# Patient Record
Sex: MALE | Race: BLACK OR AFRICAN AMERICAN | NOT HISPANIC OR LATINO | Employment: FULL TIME | URBAN - METROPOLITAN AREA
[De-identification: names, ages, dates, MRNs, and addresses within clinical notes are randomized per-mention and may not be internally consistent; named-entity substitution may affect disease eponyms.]

---
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
## 2022-04-11 NOTE — TELEPHONE ENCOUNTER
[facility] Call Center    Phone Message    May a detailed message be left on voicemail: yes     Reason for Call: Other: Please fax a copy of patients physical exam to [employer] director at [phone]. He needs it to play in the game tonight.     Action Taken: Message routed to:  [facility] ([facility]): PCC    Travel Screening: Not Applicable                                                                      
Spoke to the patient who states the his physical exam was already sent over to his  and he was good to go.. Chuyita Arceo LPN 12/8/2021 11:38 AM    
Breath sounds clear and equal bilaterally.

## 2022-04-28 ENCOUNTER — OFFICE VISIT (OUTPATIENT)
Dept: INTERNAL MEDICINE | Facility: CLINIC | Age: 22
End: 2022-04-28
Payer: COMMERCIAL

## 2022-04-28 VITALS
WEIGHT: 162 LBS | HEIGHT: 69 IN | SYSTOLIC BLOOD PRESSURE: 143 MMHG | OXYGEN SATURATION: 98 % | DIASTOLIC BLOOD PRESSURE: 81 MMHG | HEART RATE: 82 BPM | BODY MASS INDEX: 23.99 KG/M2

## 2022-04-28 DIAGNOSIS — B07.0 VERRUCA PLANTARIS: ICD-10-CM

## 2022-04-28 DIAGNOSIS — J20.9 ACUTE BRONCHITIS, UNSPECIFIED ORGANISM: Primary | ICD-10-CM

## 2022-04-28 LAB — SARS-COV-2 RNA RESP QL NAA+PROBE: NEGATIVE

## 2022-04-28 PROCEDURE — U0003 INFECTIOUS AGENT DETECTION BY NUCLEIC ACID (DNA OR RNA); SEVERE ACUTE RESPIRATORY SYNDROME CORONAVIRUS 2 (SARS-COV-2) (CORONAVIRUS DISEASE [COVID-19]), AMPLIFIED PROBE TECHNIQUE, MAKING USE OF HIGH THROUGHPUT TECHNOLOGIES AS DESCRIBED BY CMS-2020-01-R: HCPCS | Mod: 90 | Performed by: PATHOLOGY

## 2022-04-28 PROCEDURE — 99000 SPECIMEN HANDLING OFFICE-LAB: CPT | Performed by: PATHOLOGY

## 2022-04-28 PROCEDURE — 99214 OFFICE O/P EST MOD 30 MIN: CPT | Mod: CS | Performed by: STUDENT IN AN ORGANIZED HEALTH CARE EDUCATION/TRAINING PROGRAM

## 2022-04-28 PROCEDURE — U0005 INFEC AGEN DETEC AMPLI PROBE: HCPCS | Mod: 90 | Performed by: PATHOLOGY

## 2022-04-28 RX ORDER — CETIRIZINE HYDROCHLORIDE 10 MG/1
10 TABLET ORAL DAILY
COMMUNITY

## 2022-04-28 RX ORDER — GUAIFENESIN 600 MG/1
1200 TABLET, EXTENDED RELEASE ORAL 2 TIMES DAILY
Qty: 20 TABLET | Refills: 0 | Status: SHIPPED | OUTPATIENT
Start: 2022-04-28 | End: 2022-07-25

## 2022-04-28 NOTE — LETTER
Samson Morales  April 28, 2022      To Whom It May Concern,    I am, Ollie Tong, the physician taking care of Samson Morales and managing his acute illness. Please excuse him for missing his class as he was sick for the past 3 days and he will need to quarantine until his COVID-19 test comes back negative (test date 4/28/22). If patient tested positive, should follow up [facility] quarantine recommendation. Please call if you have any questions or concerns.    Thank you,    Ollie Tong MD  Internal Medicine PGY-3  [facility]   April 28, 2022

## 2022-04-28 NOTE — NURSING NOTE
Chief Complaint   Patient presents with     URI     Pt complaining of ongoing cold symptoms persisting for 4 days, eye lids have been feeling strained same with eyes themselves     Nail Problem     Right index finger growth/hangnail       Alessia Andrews, EMT at 9:42 AM on 4/28/2022

## 2022-04-28 NOTE — PROGRESS NOTES
PRIMARY CARE CENTER       SUBJECTIVE:  Samson Morales is a 21 year old male with a PMHx of seasonal allergies, acne, chronic allergic conjunctivitis who comes in for cough.    Patient reports that 2-3 days ago he started to have a runny nose which now resolved then started to have dry cough which is bothering him. His cough is worse towards the end of the day but not worse with laying in bedHe denied any fever, chills or night sweats. No sinus congestion, post nasal drip no SOB or chest pain. No known sick contacts.    He also concerned about a right index finger lesion (see below). Started awhile ago and it is nonpainful and does no itch. It a hyperpigmented small growth on the lateral edge of the nail. Lesion had grew in the past but not recently.       Also it was noticed on exam that patient's right middle finger has a linear hyperpigmented subungual discoloration. Patient reports that this has been going for awhile too.            Medications and allergies reviewed by me today.     ROS:   Constitutional, neuro, ENT, endocrine, pulmonary, cardiac, gastrointestinal, genitourinary, musculoskeletal, integument and psychiatric systems are negative, except as otherwise noted.    OBJECTIVE:    There were no vitals taken for this visit.   Wt Readings from Last 1 Encounters:   11/09/21 73.9 kg (163 lb) (66 %, Z= 0.41)*     * Growth percentiles are based on CDC (Boys, 2-20 Years) data.     Physical Exam:  General: AAOx3, NAD  HEENT: Oral mucosa moist and non-erythematous, PERRLA, EOM intact  CV: RRR, normal S1S2, no murmur, clicks, rubs  Resp: Clear to auscultation bilaterally, no wheezes, rhonchi    ASSESSMENT/PLAN:    Samson was seen today for uri and nail problem.    Diagnoses and all orders for this visit:    Acute bronchitis,   Patient with 2 days hx of rhinorrhea and cough. Most likely due to a viral ANY (bronchitis). Will continue to monitor and do further work-up if not resolving in 2  weeks  -     guaiFENesin (MUCINEX) 600 MG 12 hr tablet; Take 2 tablets (1,200 mg) by mouth 2 times daily  -     Symptomatic; Yes; 4/25/2022 COVID-19 Virus (Coronavirus) by PCR Nasopharyngeal    Verruca plantaris  Recommend to follow up in 1 month for cryotherapy with liquid nitrogen     Linear discoloration of the right index nail   Will curbside dermatology, concerns about an infections process (fungal) vs malignancy (melanoma) which is less likely giving patient's age. Will follow up with dermatology and patient for further recs      Ollie Tong MD   PGY-3  Apr 28, 2022    Pt was seen and plan of care discussed with Dr. Krause

## 2022-05-16 ENCOUNTER — OFFICE VISIT (OUTPATIENT)
Dept: ORTHOPEDICS | Facility: CLINIC | Age: 22
End: 2022-05-16
Payer: COMMERCIAL

## 2022-05-16 ENCOUNTER — PRE VISIT (OUTPATIENT)
Dept: ORTHOPEDICS | Facility: CLINIC | Age: 22
End: 2022-05-16

## 2022-05-16 VITALS — WEIGHT: 162.04 LBS | BODY MASS INDEX: 24 KG/M2 | HEIGHT: 69 IN

## 2022-05-16 DIAGNOSIS — M25.311 INSTABILITY OF BOTH SHOULDER JOINTS: Primary | ICD-10-CM

## 2022-05-16 DIAGNOSIS — M25.312 INSTABILITY OF BOTH SHOULDER JOINTS: Primary | ICD-10-CM

## 2022-05-16 PROCEDURE — 99213 OFFICE O/P EST LOW 20 MIN: CPT | Performed by: ORTHOPAEDIC SURGERY

## 2022-05-16 NOTE — LETTER
Verification of Appointment  May 16, 2022     Seen today: yes    Patient:  Samson Morales  :   2000  MRN:     [id number]  Physician: BULMARO OSBORNE    Samson Morales was seen in clinic today, 22, for his right shoulder.      Please call the clinic with any questions.      Electronically signed by Bulmaro Osborne MD

## 2022-05-16 NOTE — NURSING NOTE
"Reason For Visit:   Chief Complaint   Patient presents with     Consult     Right shoulder consult.  Ref Dr. Jb Reyes       PCP: [facility], [facility]      ?  No  Occupation Student/ Starting a job in a few weeks       Type of injury: Playing sports he dislocated a few times   Date of surgery: None   Smoker: No    Right  hand dominant    SANE score  Affected shoulder: Right   Right shoulder SANE: 90  Left shoulder SANE: 90    Ht 1.753 m (5' 9.02\")   Wt 73.5 kg (162 lb 0.6 oz)   BMI 23.92 kg/m        Pain Assessment  Patient Currently in Pain: Yes  0-10 Pain Scale: 2  Primary Pain Location: Shoulder  Pain Descriptors: Discomfort    Magui Julian LPN      "

## 2022-05-16 NOTE — LETTER
5/16/2022         RE: Samson Morales  [address] [facility] [address]        Dear Colleague,    Thank you for referring your patient, Samson Morales, to the [facility] [place]. Please see a copy of my visit note below.    CHIEF COMPLAINT:  Right shoulder pain.    HISTORY OF PRESENT ILLNESS:  Mr. Morales is a very pleasant, 21-year-old, right-hand-dominant student with a history of right greater than left shoulder pain.  He has had painless instability for years, but noticed that it got substantially worse in the fall of 2021.  He had episodes where it really was bothering him a lot when he would dive or reach for a loose ball.  He had one episode where he felt something tear, but played through it.  The pain hurts for 1-3 minutes and then disappears.  He has never had a fady instability event requiring manipulative reduction.    PAST MEDICAL HISTORY:  None.    PAST SURGICAL HISTORY:  None.    MEDICATIONS:  None.    HABITS:  He does not smoke.    SOCIAL HISTORY:  He is a student.  He plays basketball, soccer, working out at the gym and kickboxing.    FAMILY HISTORY:  Negative for dislocating shoulders, dislocating kneecaps, rotator cuff tears or rheumatoid arthritis.    RADIOGRAPHS:  AP and lateral of the glenohumeral joint and lateral scapula, date of surgery 05/16/2022, showed no evidence of fracture, dislocation or abnormal calcific focus.    MRI scan dated 11/14/2021 and reviewed independently by me shows bilateral shoulders. Right shows an anterior inferior labral tear consistent with Bankart, and left shows an anterior inferior Bankart tear with no evidence of rotator cuff disease or bone loss.    PHYSICAL EXAMINATION:  On exam today, he is a well-developed male in no apparent distress.  He articulates and communicates well with a normal affect.  His breathing is nonlabored.  His extraocular movements are intact.  He has 180 degrees of right-sided forward elevation, 180  degrees of lateral elevation, 95 degrees of external rotation at the side and internal rotation at the back to T7.  He has 5/5 forward elevator and external rotator strength.  He has no jerk test.  He has no Speed, Yergason or [place].  He has positive true apprehension in abduction and external rotation, but no relocation test.  He has a normal lift off.    On the left side, he is 180 degrees of active forward elevation, 180 degrees of lateral elevation, 95 degrees of external rotation at the side and internal rotation at the back to T7.  He has a normal lift off.  He has 5/5 forward elevator and external rotator strength.  He has a normal jerk test.  He has a normal Speed, Yergason and [place].  He has positive true apprehension with no relocation improvement.  He has normal grind bilaterally.    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    PLAN:  I had a lengthy talk with Mr. Morales today.  We talked at length about surgical and nonsurgical treatment of his shoulder.  I do not think he is really that unstable.  I think it would be reasonable to consider periscapular stabilization and strengthening with proprioceptive retraining and physical therapy.  I think there is a good likelihood that this will be all that is necessary, and we advised him to the fact that some of the things he wants to do (particularly side flank) are not necessarily even possible with surgical stabilization.  Consequently, I think that a very, very logical first step would be to try physical therapy and see if that is enough to get him better.  If it is, that would be great.  If not, we will see him back and discuss what surgical repair and reconstruction would look like.      Parish Osborne MD

## 2022-05-16 NOTE — PROGRESS NOTES
CHIEF COMPLAINT:  Right shoulder pain.    HISTORY OF PRESENT ILLNESS:  Mr. Morales is a very pleasant, 21-year-old, right-hand-dominant student with a history of right greater than left shoulder pain.  He has had painless instability for years, but noticed that it got substantially worse in the fall of 2021.  He had episodes where it really was bothering him a lot when he would dive or reach for a loose ball.  He had one episode where he felt something tear, but played through it.  The pain hurts for 1-3 minutes and then disappears.  He has never had a fady instability event requiring manipulative reduction.    PAST MEDICAL HISTORY:  None.    PAST SURGICAL HISTORY:  None.    MEDICATIONS:  None.    HABITS:  He does not smoke.    SOCIAL HISTORY:  He is a student.  He plays basketball, soccer, working out at the gym and kickboxing.    FAMILY HISTORY:  Negative for dislocating shoulders, dislocating kneecaps, rotator cuff tears or rheumatoid arthritis.    RADIOGRAPHS:  AP and lateral of the glenohumeral joint and lateral scapula, date of surgery 05/16/2022, showed no evidence of fracture, dislocation or abnormal calcific focus.    MRI scan dated 11/14/2021 and reviewed independently by me shows bilateral shoulders. Right shows an anterior inferior labral tear consistent with Bankart, and left shows an anterior inferior Bankart tear with no evidence of rotator cuff disease or bone loss.    PHYSICAL EXAMINATION:  On exam today, he is a well-developed male in no apparent distress.  He articulates and communicates well with a normal affect.  His breathing is nonlabored.  His extraocular movements are intact.  He has 180 degrees of right-sided forward elevation, 180 degrees of lateral elevation, 95 degrees of external rotation at the side and internal rotation at the back to T7.  He has 5/5 forward elevator and external rotator strength.  He has no jerk test.  He has no Speed, Yergason or [place].  He has positive true  apprehension in abduction and external rotation, but no relocation test.  He has a normal lift off.    On the left side, he is 180 degrees of active forward elevation, 180 degrees of lateral elevation, 95 degrees of external rotation at the side and internal rotation at the back to T7.  He has a normal lift off.  He has 5/5 forward elevator and external rotator strength.  He has a normal jerk test.  He has a normal Speed, [place]ergason and [place].  He has positive true apprehension with no relocation improvement.  He has normal grind bilaterally.    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    PLAN:  I had a lengthy talk with Mr. Morales today.  We talked at length about surgical and nonsurgical treatment of his shoulder.  I do not think he is really that unstable.  I think it would be reasonable to consider periscapular stabilization and strengthening with proprioceptive retraining and physical therapy.  I think there is a good likelihood that this will be all that is necessary, and we advised him to the fact that some of the things he wants to do (particularly side flank) are not necessarily even possible with surgical stabilization.  Consequently, I think that a very, very logical first step would be to try physical therapy and see if that is enough to get him better.  If it is, that would be great.  If not, we will see him back and discuss what surgical repair and reconstruction would look like.

## 2022-06-03 ENCOUNTER — THERAPY VISIT (OUTPATIENT)
Dept: PHYSICAL THERAPY | Facility: CLINIC | Age: 22
End: 2022-06-03
Attending: ORTHOPAEDIC SURGERY
Payer: COMMERCIAL

## 2022-06-03 DIAGNOSIS — M25.312 INSTABILITY OF BOTH SHOULDER JOINTS: ICD-10-CM

## 2022-06-03 DIAGNOSIS — M25.311 INSTABILITY OF BOTH SHOULDER JOINTS: ICD-10-CM

## 2022-06-03 PROCEDURE — 97161 PT EVAL LOW COMPLEX 20 MIN: CPT | Mod: GP | Performed by: PHYSICAL THERAPIST

## 2022-06-03 PROCEDURE — 97110 THERAPEUTIC EXERCISES: CPT | Mod: GP | Performed by: PHYSICAL THERAPIST

## 2022-06-03 PROCEDURE — 97112 NEUROMUSCULAR REEDUCATION: CPT | Mod: GP | Performed by: PHYSICAL THERAPIST

## 2022-06-03 NOTE — PROGRESS NOTES
[facility]    OUTPATIENT Physical Therapy ORTHOPEDIC EVALUATION  PLAN OF TREATMENT FOR OUTPATIENT REHABILITATION  (COMPLETE FOR INITIAL CLAIMS ONLY)  Patient's Last Name, First Name, M.I.  YOB: 2000  Samson Morales    Provider s Name:  [facility]   Medical Record No.  [id number]   Start of Care Date:  06/03/22   Onset Date:   10/01/21   Type:     _X__PT   ___OT Medical Diagnosis:    Encounter Diagnosis   Name Primary?    Instability of both shoulder joints         Treatment Diagnosis:  Bilateral shoulder pain, instability        Goals:     06/03/22 0500   Body Part   Goals listed below are for B shoulder   Goal #1   Goal #1 lifting/carrying   Previous Functional Level No restrictions   Current Functional Level Cannot lift;overhead for   Performance level any object >10 pounds, 7/10 pain   STG [employer] Performance Lift an item overhead weighing   Performance level 15 pounds, 3/10 pain or better   Rationale for grocery shopping;for meal preparation;for yard work such as shoveling snow;for housework such as laundry, emptying garbage, use of ;to perform job duties at work   Due date 07/01/22   LTG [employer] Lift an item overhead weighing   Performance Level 20 pounds with 1/10 shoulder pain   Rationale for grocery shopping;for housework such as laundry, emptying garbage, use of ;for yard work such as shoveling snow;to perform job duties at work;for meal preparation   Due date 07/29/22       Therapy Frequency:  2x/month  Predicted Duration of Therapy Intervention:  3 months, 6 visits total    Silvio Raya, PT                 I CERTIFY THE NEED FOR THESE SERVICES FURNISHED UNDER        THIS PLAN OF TREATMENT AND WHILE UNDER MY CARE     (Physician co-signature of this document indicates review and certification of the therapy plan).                      Certification Date From:  06/03/22   Certification Date To:  08/26/22    Referring Provider:  Parish Osborne    Initial Assessment        See Epic Evaluation SOC Date: 06/03/22

## 2022-06-03 NOTE — PROGRESS NOTES
"Physical Therapy Initial Evaluation  Subjective:  The history is provided by the patient. No  was used.   Patient Health History  Samson Morales being seen for Bilateral shoulder pain, unsteadiness in shoulder while weight lifting and playing sports.     Problem began: 10/1/2021.   Problem occurred: various sports injuries from diving, pulling, blocking and overhead reaching. One or two times where patient heard audible \"pop\" or tear in shoulder while playing basketball   Pain is reported as 0/10 (7/10 at worst when in full shoulder flexion) on pain scale.  General health as reported by patient is excellent.  Pertinent medical history includes: none.   Red flags:  None as reported by patient.  Medical allergies: none.   Surgeries include:  None.    Current medications:  None.    Current occupation is Student.                     Therapist Generated HPI Evaluation         Type of problem:  Bilateral shoulders (right more than left).    This is a chronic condition.  Condition occurred with:  Lifting (recreation/sports).  Where condition occurred: during recreation/sport.  Patient reports pain:  Posterior and in the joint.  Pain is described as aching and is intermittent.  Pain radiates to:  Shoulder. Pain timing: worse with overhead activities.  Since onset symptoms are gradually worsening.  Associated symptoms:  Loss of strength and painful arc (\"wobbly\"). Symptoms are exacerbated by using arm at shoulder level, using arm behind back and carrying  Relieved by: avoiding overhead motions.    There was none improvement following previous treatment.  Restrictions due to condition include:  Working in normal job without restrictions.  Barriers include:  None as reported by patient.                        Objective:  Standing Alignment:      Shoulder/UE:  Rounded shoulders and scapular winging R                                       Shoulder Evaluation:  ROM:  AROM:  : Full AROM, mild pain in posterior " shoulder with end range IR and flexion.                              Pain: Pain at end range flexion posterior shoulder and laterally; IR painful in joint area mostly posterior    Strength:    Flexion: Left:5/5   Pain:    Right: 5/5     Pain:   Extension:  Left: 5/5    Pain:    Right: 5/5    Pain:  Abduction:  Left: 5/5  Pain:    Right: 5/5     Pain:    Internal Rotation:  Left:5/5     Pain:    Right: 5/5      Pain:+  External Rotation:   Left:4/5     Pain:   Right:4/5     Pain:            Stability Testing:    Left shoulder stability positive testing:  Apprehension  Left shoulder stability negative testing:  Relocation; Load and shift anterior and Load and shift posterior    Right shoulder stability negative testing:  Apprehension; Relocation; Load and shift anterior and Load and shift posterior  Special Tests:  Special tests assessed shoulder: + HK bilaterally.  Left shoulder positive for the following special tests:  Impingement  Right shoulder positive for the following special tests:Impingement  Palpation:  normal      Mobility Tests:    Glenohumeral anterior left:  Hypermobile  Glenohumeral anterior right:  Hypermobile  Glenohumeral posterior right:  Hypermobile                                             General     ROS    Assessment/Plan:    Patient is a 21 year old male with both sides shoulder complaints.    Patient has the following significant findings with corresponding treatment plan.                Diagnosis 1:  Bilateral shoulder pain, instability  Pain -  manual therapy, self management, education and home program  Decreased ROM/flexibility - manual therapy, therapeutic exercise and home program  Decreased joint mobility - manual therapy, therapeutic exercise and home program  Decreased strength - therapeutic exercise, therapeutic activities and home program  Impaired muscle performance - neuro re-education and home program  Decreased function - therapeutic activities and home program  Instability -   Therapeutic Activity  Therapeutic Exercise  home program    Therapy Evaluation Codes:   Cumulative Therapy Evaluation is: Low complexity.    Previous and current functional limitations:  (See Goal Flow Sheet for this information)    Short term and Long term goals: (See Goal Flow Sheet for this information)     Communication ability:  Patient appears to be able to clearly communicate and understand verbal and written communication and follow directions correctly.  Treatment Explanation - The following has been discussed with the patient:   RX ordered/plan of care  Anticipated outcomes  Possible risks and side effects  This patient would benefit from PT intervention to resume normal activities.   Rehab potential is good.    Frequency:  2 X a month, once daily  Duration:  for 3 months  Discharge Plan:  Achieve all LTG.  Independent in home treatment program.  Reach maximal therapeutic benefit.    Please refer to the daily flowsheet for treatment today, total treatment time and time spent performing 1:1 timed codes.

## 2022-07-23 ENCOUNTER — LAB (OUTPATIENT)
Dept: LAB | Facility: CLINIC | Age: 22
End: 2022-07-23
Payer: COMMERCIAL

## 2022-07-23 DIAGNOSIS — Z20.822 ENCOUNTER FOR LABORATORY TESTING FOR COVID-19 VIRUS: ICD-10-CM

## 2022-07-23 LAB — SARS-COV-2 RNA RESP QL NAA+PROBE: NEGATIVE

## 2022-07-23 PROCEDURE — U0003 INFECTIOUS AGENT DETECTION BY NUCLEIC ACID (DNA OR RNA); SEVERE ACUTE RESPIRATORY SYNDROME CORONAVIRUS 2 (SARS-COV-2) (CORONAVIRUS DISEASE [COVID-19]), AMPLIFIED PROBE TECHNIQUE, MAKING USE OF HIGH THROUGHPUT TECHNOLOGIES AS DESCRIBED BY CMS-2020-01-R: HCPCS | Mod: 90 | Performed by: PATHOLOGY

## 2022-07-23 PROCEDURE — U0005 INFEC AGEN DETEC AMPLI PROBE: HCPCS | Mod: 90 | Performed by: PATHOLOGY

## 2022-07-23 PROCEDURE — 99000 SPECIMEN HANDLING OFFICE-LAB: CPT | Performed by: PATHOLOGY

## 2022-07-25 ENCOUNTER — OFFICE VISIT (OUTPATIENT)
Dept: INTERNAL MEDICINE | Facility: CLINIC | Age: 22
End: 2022-07-25

## 2022-07-25 ENCOUNTER — LAB (OUTPATIENT)
Dept: LAB | Facility: CLINIC | Age: 22
End: 2022-07-25
Payer: COMMERCIAL

## 2022-07-25 VITALS
HEIGHT: 69 IN | OXYGEN SATURATION: 99 % | HEART RATE: 64 BPM | WEIGHT: 163.8 LBS | DIASTOLIC BLOOD PRESSURE: 84 MMHG | SYSTOLIC BLOOD PRESSURE: 122 MMHG | BODY MASS INDEX: 24.26 KG/M2

## 2022-07-25 DIAGNOSIS — Z01.818 PREOP GENERAL PHYSICAL EXAM: Primary | ICD-10-CM

## 2022-07-25 DIAGNOSIS — Z01.818 PREOP GENERAL PHYSICAL EXAM: ICD-10-CM

## 2022-07-25 LAB
ANION GAP SERPL CALCULATED.3IONS-SCNC: <1 MMOL/L (ref 3–14)
BUN SERPL-MCNC: 14 MG/DL (ref 7–30)
CALCIUM SERPL-MCNC: 9.6 MG/DL (ref 8.5–10.1)
CHLORIDE BLD-SCNC: 105 MMOL/L (ref 98–110)
CO2 SERPL-SCNC: 29 MMOL/L (ref 20–32)
CREAT SERPL-MCNC: 0.88 MG/DL (ref 0.5–1)
ERYTHROCYTE [DISTWIDTH] IN BLOOD BY AUTOMATED COUNT: 12.9 % (ref 10–15)
GFR SERPL CREATININE-BSD FRML MDRD: >90 ML/MIN/1.73M2
GLUCOSE BLD-MCNC: 80 MG/DL (ref 70–99)
HCT VFR BLD AUTO: 43.9 % (ref 40–53)
HGB BLD-MCNC: 14.1 G/DL (ref 13.3–17.7)
MCH RBC QN AUTO: 25.6 PG (ref 26.5–33)
MCHC RBC AUTO-ENTMCNC: 32.1 G/DL (ref 31.5–36.5)
MCV RBC AUTO: 80 FL (ref 78–100)
PLATELET # BLD AUTO: 300 10E3/UL (ref 150–450)
POTASSIUM BLD-SCNC: 4.3 MMOL/L (ref 3.4–5.3)
RBC # BLD AUTO: 5.51 10E6/UL (ref 4.4–5.9)
SODIUM SERPL-SCNC: 131 MMOL/L (ref 133–144)
WBC # BLD AUTO: 5.2 10E3/UL (ref 4–11)

## 2022-07-25 PROCEDURE — 36415 COLL VENOUS BLD VENIPUNCTURE: CPT | Performed by: PATHOLOGY

## 2022-07-25 PROCEDURE — 85027 COMPLETE CBC AUTOMATED: CPT | Performed by: PATHOLOGY

## 2022-07-25 PROCEDURE — 99213 OFFICE O/P EST LOW 20 MIN: CPT | Performed by: HOSPITALIST

## 2022-07-25 PROCEDURE — 80048 BASIC METABOLIC PNL TOTAL CA: CPT | Performed by: PATHOLOGY

## 2022-07-25 NOTE — PATIENT INSTRUCTIONS
Approved for procedure.   Needs baseline labs with CBC and BMP.   No Ibuprofen, Aleve, Excedrin, Naproxen from now to surgery.   No alcohol from now to surgery.     Follow up as needed.   04-Jul-2018

## 2022-07-25 NOTE — PROGRESS NOTES
Surgeon (please enter first and last name): Elias Menjivar  Fax number for Preop Evaluation: [phone]  Location of Surgery: ENT specialty care   Date of Surgery: 7/28/22  Procedure: Fractured nose surgery per patient.  History of reaction to anesthesia? No    Garett Purcell, EMT at 3:09 PM on 7/25/2022.

## 2022-07-25 NOTE — PROGRESS NOTES
[facility] INTERNAL MEDICINE [address]  4TH FLOOR  [facility] [address]  Phone: [phone]  Fax: [phone]  Primary Provider: [facility]  Pre-op Performing Provider: MICHAEL VERAS    {Provider  Link to PREOP SmartSet  Use this to apply standard patient instructions to AVS; includes medication directions, common orders, guidelines for anemia, warfarin, additional testing   :[id number]}  PREOPERATIVE EVALUATION:  Today's date: 7/25/2022    Samson Morales is a 21 year old male who presents for a preoperative evaluation.    Surgical Information:  Surgery/Procedure: Deviated septum correction  Surgery Location: ENT specialty care   Surgeon: Dr. Elias Menjivar  Surgery Date: 7/28/22  Time of Surgery: To be determine, told in the morning.  Where patient plans to recover: At home with family  Fax number for surgical facility: [phone]    Type of Anesthesia Anticipated: to be determined    Assessment & Plan     The proposed surgical procedure is considered INTERMEDIATE risk.    Problem List Items Addressed This Visit        Other    Preop general physical exam - Primary     Approved for procedure. RCRI 0 of 6. METs likely 4 and above. No clotting disorders. No medication allergies. Has left nostril polyp present with left nasal deviation. COVID19 test on 7/23/22 was negative.     - CBC is normal with Hb at 14.1.   - BMP with Cr at 0.88. Sodium is mildly low at 131, not clear reason but likely not to impact prep for surgery.   - Advised no Ibuprofen, Aleve, Excedrin, Naproxen from now to surgery.   - Advised no alcohol from now to surgery.            Relevant Orders    CBC with platelets (Completed)    Basic metabolic panel (Completed)            RECOMMENDATION:  APPROVAL GIVEN to proceed with proposed procedure, without further diagnostic evaluation.    Review of external notes as documented above     25 minutes spent on the date of the  encounter doing chart review, history and exam, documentation and further activities per the note        Subjective     HPI related to upcoming procedure: Patient mentions having left nostril irritation at times. Mentions his nose runs more during the winter and sometimes bleeds when it gets bad. He does have troubles breathing when playing sports. Mentions he does have a nodule in his let nostril. No fevers, chills, chest pains, currently bleeding issues.     Preop Questions 7/25/2022   1. Have you ever had a heart attack or stroke? No   2. Have you ever had surgery on your heart or blood vessels, such as a stent placement, a coronary artery bypass, or surgery on an artery in your head, neck, heart, or legs? No   3. Do you have chest pain with activity? No   4. Do you have a history of  heart failure? No   5. Do you currently have a cold, bronchitis or symptoms of other infection? No   6. Do you have a cough, shortness of breath, or wheezing? No   7. Do you or anyone in your family have previous history of blood clots? No   8. Do you or does anyone in your family have a serious bleeding problem such as prolonged bleeding following surgeries or cuts? No   9. Have you ever had problems with anemia or been told to take iron pills? No   10. Have you had any abnormal blood loss such as black, tarry or bloody stools? No   11. Have you ever had a blood transfusion? No   12. Are you willing to have a blood transfusion if it is medically needed before, during, or after your surgery? Yes   13. Have you or any of your relatives ever had problems with anesthesia? No   14. Do you have sleep apnea, excessive snoring or daytime drowsiness? No   15. Do you have any artifical heart valves or other implanted medical devices like a pacemaker, defibrillator, or continuous glucose monitor? No   16. Do you have artificial joints? No   17. Are you allergic to latex? No       Health Care Directive:  Patient does not have a Health Care  Directive or Living Will: Full code for procedure    Preoperative Review of :   reviewed - no record of controlled substances prescribed.        Review of Systems  CONSTITUTIONAL: NEGATIVE for fever, chills, change in weight  INTEGUMENTARY/SKIN: NEGATIVE for worrisome rashes, moles or lesions  EYES: NEGATIVE for vision changes or irritation  ENT/MOUTH: NEGATIVE for ear, mouth and throat problems  RESP: NEGATIVE for significant cough or SOB  CV: NEGATIVE for chest pain, palpitations or peripheral edema  GI: NEGATIVE for nausea, abdominal pain, heartburn, or change in bowel habits  : NEGATIVE for frequency, dysuria, or hematuria  MUSCULOSKELETAL: NEGATIVE for significant arthralgias or myalgia  NEURO: NEGATIVE for weakness, dizziness or paresthesias  ENDOCRINE: NEGATIVE for temperature intolerance, skin/hair changes  HEME: NEGATIVE for bleeding problems  PSYCHIATRIC: NEGATIVE for changes in mood or affect    Patient Active Problem List    Diagnosis Date Noted     Instability of both shoulder joints 05/16/2022     Priority: Medium     Instability of right shoulder joint 10/18/2019     Priority: Medium     Allergic rhinitis due to pollen 04/17/2019     Priority: Medium     Chronic allergic conjunctivitis 09/09/2016     Priority: Medium     Episodic tension-type headache, not intractable 09/09/2016     Priority: Medium     IMMUNIZATION HISTORY 12/22/2014     Priority: Medium     needs hep A #2 after 2/4/15  Starting HPV 12/22/2014         Nutritional deficiency 12/22/2014     Priority: Medium     Limited 0-1 serving daily/day - will give calcium 500mg/day and MV       Vision problem 12/22/2014     Priority: Medium     Wears glasses and is followed by optometry       Environmental allergies 12/17/2014     Priority: Medium      Past Medical History:   Diagnosis Date     Eczema      Seasonal allergies      No past surgical history on file.  Current Outpatient Medications   Medication Sig Dispense Refill      "cetirizine (ZYRTEC) 10 MG tablet Take 10 mg by mouth daily         No Known Allergies     Social History     Tobacco Use     Smoking status: Never Smoker     Smokeless tobacco: Never Used   Substance Use Topics     Alcohol use: No     No family history on file.  History   Drug Use Unknown         Objective     /84 (BP Location: Right arm, Patient Position: Sitting, Cuff Size: Adult Regular)   Pulse 64   Ht 1.753 m (5' 9\")   Wt 74.3 kg (163 lb 12.8 oz)   SpO2 99%   BMI 24.19 kg/m      Physical Exam    GENERAL APPEARANCE: healthy, alert and no distress     EYES: EOMI,  PERRL     HENT: ear canals and TM's normal and mouth without ulcers or lesions. Has left nasal deviation. Has left nostril polyp present.      NECK: no adenopathy, no asymmetry, masses, or scars and thyroid normal to palpation     RESP: lungs clear to auscultation - no rales, rhonchi or wheezes     CV: regular rates and rhythm, normal S1 S2, no S3 or S4 and no murmur, click or rub     ABDOMEN:  soft, nontender, no HSM or masses and bowel sounds normal     MS: extremities normal- no gross deformities noted, no evidence of inflammation in joints, FROM in all extremities.     SKIN: no suspicious lesions or rashes     NEURO: Normal strength and tone, sensory exam grossly normal, mentation intact and speech normal     PSYCH: mentation appears normal. and affect normal/bright    No results for input(s): HGB, PLT, INR, NA, POTASSIUM, CR, A1C in the last [id number] hours.     Diagnostics:  Today on 7/25/22:  BMP shows Creatinine of 0.88. Sodium is mildly low at 131. Potassium is normal at 4.3, chloride normal at 105, calcium normal at 9.6, and glucose of 80.   CBC shows WBC 5.2, Hb 14.1, Platelets 300.    COVID19 test negative on 7/23/22.     Revised Cardiac Risk Index (RCRI):  The patient has the following serious cardiovascular risks for perioperative complications:   - No serious cardiac risks = 0 points     RCRI Interpretation: 0 points: Class I (very " low risk - 0.4% complication rate)           Signed Electronically by: Rodolfo Mackey MD  Copy of this evaluation report is provided to requesting physician.

## 2022-07-25 NOTE — NURSING NOTE
Chief Complaint   Patient presents with     Pre-Op Exam       ALEXANDER Zapata at 3:16 PM on 7/25/2022.

## 2022-07-27 ENCOUNTER — THERAPY VISIT (OUTPATIENT)
Dept: PHYSICAL THERAPY | Facility: CLINIC | Age: 22
End: 2022-07-27
Payer: COMMERCIAL

## 2022-07-27 DIAGNOSIS — M25.311 INSTABILITY OF BOTH SHOULDER JOINTS: Primary | ICD-10-CM

## 2022-07-27 DIAGNOSIS — M25.312 INSTABILITY OF BOTH SHOULDER JOINTS: Primary | ICD-10-CM

## 2022-07-27 PROCEDURE — 97110 THERAPEUTIC EXERCISES: CPT | Mod: GP | Performed by: PHYSICAL THERAPIST

## 2022-09-11 ENCOUNTER — HEALTH MAINTENANCE LETTER (OUTPATIENT)
Age: 22
End: 2022-09-11

## 2023-01-19 ENCOUNTER — OFFICE VISIT (OUTPATIENT)
Dept: INTERNAL MEDICINE | Facility: CLINIC | Age: 23
End: 2023-01-19
Payer: COMMERCIAL

## 2023-01-19 VITALS
OXYGEN SATURATION: 98 % | SYSTOLIC BLOOD PRESSURE: 123 MMHG | HEIGHT: 69 IN | WEIGHT: 176.8 LBS | DIASTOLIC BLOOD PRESSURE: 83 MMHG | BODY MASS INDEX: 26.19 KG/M2 | HEART RATE: 56 BPM

## 2023-01-19 DIAGNOSIS — A63.0 GENITAL WARTS: Primary | ICD-10-CM

## 2023-01-19 PROCEDURE — 99214 OFFICE O/P EST MOD 30 MIN: CPT | Mod: GC | Performed by: STUDENT IN AN ORGANIZED HEALTH CARE EDUCATION/TRAINING PROGRAM

## 2023-01-19 RX ORDER — IMIQUIMOD 37.5 MG/G
CREAM TOPICAL DAILY
Qty: 7.5 G | Refills: 1 | Status: SHIPPED | OUTPATIENT
Start: 2023-01-19 | End: 2023-05-03

## 2023-01-19 NOTE — PROGRESS NOTES
"CC: penile lesions      HPI:  22M presents today to discuss wart on finger and lesions on the head of his penis. These have been present for about a month or so. Nonpainful, no draining, no dysuria, white/pale in color, slightly raised. Tells me he has never been sexually active. No fevers. No new lotions or topical treatments, not itching. No similar rash in other locations though does have a wart on his finger per below.    For wart on finger, has been present for a long time. He wants this frozen off. Has been present for sometime but its location causes him some irritation so wants removed.    PMH: I have personally reviewed the patient's medical history, medications, and allergies.      /83 (BP Location: Right arm, Patient Position: Sitting, Cuff Size: Adult Regular)   Pulse 56   Ht 1.753 m (5' 9.02\")   Wt 80.2 kg (176 lb 12.8 oz)   SpO2 98%   BMI 26.10 kg/m    Physical Examination:    General:  Alert, NAD  HEENT: NC/AT.   Lungs: breathing comfortably on room air  : few ~0.5cm hypopigmented papules along the shaft and head of penis; no ulcers/erythema/penile discharge noted.  Neuro: Alert and conversant, face symmetric. No gross neurologic deficits.  Skin:   Genital exam per above. One 1cm wart on lateral aspect of second digit of left hand.  Psych:  Alert and oriented. Appropriate affect.      Exam done with chaperone present (Freddy).    A&P:  Genital warts  -topical imiquimod daily x8 weeks  -dermatology referral if does not improve with above treatment    Verruca vulgaris, second digit left hand  -cryotherapy performed in clinic today. Applied 3 times. Done with preceptor present.    Shantal Urban MD  IM Resident PGY-3    This patient was discussed and seen with Dr Medina  "

## 2023-01-19 NOTE — NURSING NOTE
Samson Morales is a 22 year old male patient that presents today in clinic for the following:    Chief Complaint   Patient presents with     RECHECK     Genital bumps, concerns about finger      The patient's allergies and medications were reviewed as noted. A set of vitals were recorded as noted without incident. The patient does not have any other questions for the provider.    Jose Luis Brown, EMT at 10:10 AM on 1/19/2023

## 2023-01-22 ENCOUNTER — HEALTH MAINTENANCE LETTER (OUTPATIENT)
Age: 23
End: 2023-01-22

## 2023-04-04 ENCOUNTER — OFFICE VISIT (OUTPATIENT)
Dept: INTERNAL MEDICINE | Facility: CLINIC | Age: 23
End: 2023-04-04
Payer: COMMERCIAL

## 2023-04-04 VITALS
BODY MASS INDEX: 25.83 KG/M2 | WEIGHT: 174.4 LBS | OXYGEN SATURATION: 97 % | HEIGHT: 69 IN | HEART RATE: 57 BPM | DIASTOLIC BLOOD PRESSURE: 68 MMHG | SYSTOLIC BLOOD PRESSURE: 126 MMHG

## 2023-04-04 DIAGNOSIS — R05.3 CHRONIC COUGH: Primary | ICD-10-CM

## 2023-04-04 PROCEDURE — 17110 DESTRUCTION B9 LES UP TO 14: CPT | Performed by: NURSE PRACTITIONER

## 2023-04-04 PROCEDURE — 99213 OFFICE O/P EST LOW 20 MIN: CPT | Mod: 25 | Performed by: NURSE PRACTITIONER

## 2023-04-04 RX ORDER — ALBUTEROL SULFATE 90 UG/1
2 AEROSOL, METERED RESPIRATORY (INHALATION) EVERY 4 HOURS PRN
Qty: 18 G | Refills: 1 | Status: SHIPPED | OUTPATIENT
Start: 2023-04-04

## 2023-04-04 NOTE — PATIENT INSTRUCTIONS
Wart treatment:  apply Compound W to affected area daily after a few days from clinic appointment.

## 2023-04-04 NOTE — PROGRESS NOTES
"S: Samson Morales is a 22 year old male here due to a lingering cough after an acute illness about two weeks ago. It is worse in the evening and he thinks it is aggravated when talking on the phone with friends. He had an acute illness 2-3 weeks ago with sore throat.  He describes the cough as high pitched and now mostly non-productive. He had a fever in the first days of the illness.He does not think he wheezes. It has improved since onset but he thought he should have it checked.                Patient Active Problem List   Diagnosis     Environmental allergies     IMMUNIZATION HISTORY     Nutritional deficiency     Vision problem     Chronic allergic conjunctivitis     Episodic tension-type headache, not intractable     Allergic rhinitis due to pollen     Instability of right shoulder joint     Instability of both shoulder joints     Preop general physical exam            Past Medical History:   Diagnosis Date     Eczema      Seasonal allergies           No past surgical history on file.         Social History     Tobacco Use     Smoking status: Never     Smokeless tobacco: Never   Vaping Use     Vaping status: Not on file   Substance Use Topics     Alcohol use: No          No family history on file.          No Known Allergies         Current Outpatient Medications   Medication Sig Dispense Refill     cetirizine (ZYRTEC) 10 MG tablet Take 10 mg by mouth daily       imiquimod 3.75 % CREA Externally apply topically daily 7.5 g 1       REVIEW OF SYSTEMS:  See above.    O:   Ht 1.753 m (5' 9.02\")   BMI 26.10 kg/m    GENERAL APPEARANCE: healthy, alert and no distress  EYES:sclera white, conjunctiva normal.  HENT: ear canals and TM's normal and mouth without ulcers or lesions  RESP: lungs clear to auscultation - no rales, rhonchi or wheezes  CV: regular rates and rhythm, normal S1 S2, no S3 or S4 and no murmur, click or rub   NEURO: alert and oriented.  Good historian.  MUSK: ambulatory.  SKIN: One 1cm wart on " lateral aspect of second digit of left hand.  LYMPH: neg cervical, supra and infraclavicular nodes.  EXT: warm.  Edema: none  PSYCHE:         A/P:  Samson was seen today for cough.    Diagnoses and all orders for this visit:    Chronic cough  -     albuterol (PROAIR HFA/PROVENTIL HFA/VENTOLIN HFA) 108 (90 Base) MCG/ACT inhaler; Inhale 2 puffs into the lungs every 4 hours as needed for shortness of breath, wheezing or cough      Verruca vulgaris,  second digit left hand medial axel-ungual area  -cryotherapy performed in clinic today. Applied 3 times.  He is advised to apply OTC Compound W at home.        @THIS VISIT    The patient voiced understanding of the information discussed and all questions were answered.     I spent a total of      minutes in the care of this pt during today's office visit. This time includes reviewing the patient's chart and prior history, obtaining a history, performing an examination and evaluation and counseling the patient. This time also includes ordering medications or tests necessary in addition to communication to other member's of the patient's health care team. Time spent in documentation and care coordination is included.     Staci ENCINAS, CNP

## 2023-04-04 NOTE — NURSING NOTE
"Samson Morales is a 22 year old male patient that presents today in clinic for the following:    Chief Complaint   Patient presents with     Cough     Pt here for cough that occurs a lot during the night but more recently is not as much. Pt states started 2-3 weeks ago. Pt has an area on right index finger that hurts when touched.     The patient's allergies and medications were reviewed as noted. A set of vitals were recorded as noted without incident: /68 (BP Location: Right arm, Patient Position: Sitting, Cuff Size: Adult Regular)   Pulse 57   Ht 1.753 m (5' 9.02\")   Wt 79.1 kg (174 lb 6.4 oz)   SpO2 97%   BMI 25.74 kg/m  . The patient does not have any other questions for the provider.    Kaila Washington, EMT at 4:01 PM on 4/4/2023  "

## 2023-04-21 ENCOUNTER — OFFICE VISIT (OUTPATIENT)
Dept: PODIATRY | Facility: CLINIC | Age: 23
End: 2023-04-21
Payer: COMMERCIAL

## 2023-04-21 ENCOUNTER — ANCILLARY PROCEDURE (OUTPATIENT)
Dept: GENERAL RADIOLOGY | Facility: CLINIC | Age: 23
End: 2023-04-21
Attending: PODIATRIST
Payer: COMMERCIAL

## 2023-04-21 VITALS — BODY MASS INDEX: 25.68 KG/M2 | WEIGHT: 174 LBS

## 2023-04-21 DIAGNOSIS — S93.422A SPRAIN OF DELTOID LIGAMENT OF LEFT ANKLE, INITIAL ENCOUNTER: Primary | ICD-10-CM

## 2023-04-21 DIAGNOSIS — S99.912A LEFT ANKLE INJURY: ICD-10-CM

## 2023-04-21 DIAGNOSIS — M25.372 ANKLE INSTABILITY, LEFT: ICD-10-CM

## 2023-04-21 DIAGNOSIS — S93.492A SPRAIN OF ANTERIOR TALOFIBULAR LIGAMENT, LEFT, INITIAL ENCOUNTER: ICD-10-CM

## 2023-04-21 PROCEDURE — 73610 X-RAY EXAM OF ANKLE: CPT | Mod: TC | Performed by: RADIOLOGY

## 2023-04-21 PROCEDURE — 99204 OFFICE O/P NEW MOD 45 MIN: CPT | Performed by: PODIATRIST

## 2023-04-21 NOTE — LETTER
April 21, 2023      Samson Morales  [address] [place] [address]        To Whom It May Concern:    Samson Morales was seen in our clinic on 4/21/23 for evaluation of left ankle injury. Please allow him to return to work with the following restrictions:     Work in the boot for 6 weeks     For questions or concerns, please contact the number listed above.       Sincerely,        Aundrea Schwartz DPM    (Electronically signed)

## 2023-04-21 NOTE — PROGRESS NOTES
Assessment:      ICD-10-CM    1. Sprain of deltoid ligament of left ankle, initial encounter  S93.422A XR Ankle Left G/E 3 Views     Orthotics and Prosthetics DME Orthotic; Foot Orthotics     MR Ankle Left w/o Contrast      2. Sprain of anterior talofibular ligament, left, initial encounter  S93.492A Orthotics and Prosthetics DME Orthotic; Foot Orthotics     MR Ankle Left w/o Contrast      3. Ankle instability, left  M25.372 Orthotics and Prosthetics DME Orthotic; Foot Orthotics     MR Ankle Left w/o Contrast          Plan:  Orders Placed This Encounter   Procedures     XR Ankle Left G/E 3 Views     MR Ankle Left w/o Contrast     Ankle/Foot Bracing Supplies DME Walking Boot; Left; Pneumatic; Tall     Orthotics and Prosthetics DME Orthotic; Foot Orthotics       Discussed the etiology and treatment of the condition with the patient.  Imaging studies reviewed and discussed with the patient.  Discussed surgical and conservative options.      Acute Deltoid sprain & avlusion Fx anterior colliculus  Recommend MRI to evaluate deep deltoid    -Immobilization- boot WB x6 weeks  -WB- minimal in boot  -Activity- minimal in boot, no impact x3 months  -PT- once out of boot      Return:  Return in about 6 weeks (around 6/2/2023).    Aundrea Schwartz DPM                Chief Complaint:     Patient presents with:  Left Ankle - Pain     left ankle injury.    HPI:  Samson Morales is a 22 year old year old male who presents for evaluation of ankle injury.    Date of injury- 4/19/23  Mechanism of injury- sprained playing basketball  Pain location- medial ankle     Pt states he has sprained the L ankle 3 times previously.  He has never had it this swollen before  States he has flat feet      Past Medical & Surgical History:  Past Medical History:   Diagnosis Date     Eczema      Seasonal allergies       No past surgical history on file.   No family history on file.     Social History:  ?  History   Smoking Status     Never    Smokeless Tobacco     Never     History   Drug Use Unknown     Social History    Substance and Sexual Activity      Alcohol use: No      Allergies:  ?   No Known Allergies     Medications:    Current Outpatient Medications   Medication     albuterol (PROAIR HFA/PROVENTIL HFA/VENTOLIN HFA) 108 (90 Base) MCG/ACT inhaler     cetirizine (ZYRTEC) 10 MG tablet     imiquimod 3.75 % CREA     No current facility-administered medications for this visit.       Physical Exam:  ?  Vitals:  Wt 78.9 kg (174 lb)   BMI 25.68 kg/m     General:  WD/WN, in NAD.  A&O x3.  Dermatologic:    Skin is intact, open lesions absent.   Bruising present medial > lateral ankle L.  Skin texture, turgor is normal.  Vascular:  Pulses palpable bilateral.  Digital capillary refill time normal bilateral.  Skin temperature is warm L  Generalized edema- none bilateral..  Focal edema- moderate anterior medial ankle joint, L.  Neurologic:    Gross sensation normal.  Gait and balance abnormal, antalgic, L.  Musculoskeletal:  Maximal pain to palpation of anterior colliculus, medial malleolus over superficial deltoid, L.  mild pain to palpation of ATFL / lateral ankle gutter, L  No pain to palpation AITFL, CFL, peroneals, L  Ankle ROM full, painful  L.  Guards to drawer exam, L  Anterior drawer stable right.  Talar tilt stable right.  Tib / Fib squeeze test pain free left.    Muscle strength 5/5  to uninjured extremity, guarding of injured extremity.     Patient is able to bear weight on the injured extremity.  Patient is able to walk on the injured extremity.    Imaging:  x-ray independently reviewed and interpreted by myself today.  Weight-bearing views left ankle dated 04/21/23, reveal anterior colliculus avulsion Fx, mortise with 1mm overlap on AP, none on MO.  Flatfoot without degeneration.

## 2023-04-21 NOTE — LETTER
4/21/2023         RE: Samson Morales  [address]  [place] [address]        Dear Colleague,    Thank you for referring your patient, Samson Morales, to the [facility]. Please see a copy of my visit note below.    Assessment:      ICD-10-CM    1. Sprain of deltoid ligament of left ankle, initial encounter  S93.422A XR Ankle Left G/E 3 Views     Orthotics and Prosthetics DME Orthotic; Foot Orthotics     MR Ankle Left w/o Contrast      2. Sprain of anterior talofibular ligament, left, initial encounter  S93.492A Orthotics and Prosthetics DME Orthotic; Foot Orthotics     MR Ankle Left w/o Contrast      3. Ankle instability, left  M25.372 Orthotics and Prosthetics DME Orthotic; Foot Orthotics     MR Ankle Left w/o Contrast          Plan:  Orders Placed This Encounter   Procedures     XR Ankle Left G/E 3 Views     MR Ankle Left w/o Contrast     Ankle/Foot Bracing Supplies DME Walking Boot; Left; Pneumatic; Tall     Orthotics and Prosthetics DME Orthotic; Foot Orthotics       Discussed the etiology and treatment of the condition with the patient.  Imaging studies reviewed and discussed with the patient.  Discussed surgical and conservative options.      Acute Deltoid sprain & avlusion Fx anterior colliculus  Recommend MRI to evaluate deep deltoid    -Immobilization- boot WB x6 weeks  -WB- minimal in boot  -Activity- minimal in boot, no impact x3 months  -PT- once out of boot      Return:  Return in about 6 weeks (around 6/2/2023).    Aundrea Schwartz DPM                Chief Complaint:     Patient presents with:  Left Ankle - Pain     left ankle injury.    HPI:  Samson Morales is a 22 year old year old male who presents for evaluation of ankle injury.    Date of injury- 4/19/23  Mechanism of injury- sprained playing basketball  Pain location- medial ankle     Pt states he has sprained the L ankle 3 times previously.  He has never had it this swollen before  States he has flat  feet      Past Medical & Surgical History:  Past Medical History:   Diagnosis Date     Eczema      Seasonal allergies       No past surgical history on file.   No family history on file.     Social History:  ?  History   Smoking Status     Never   Smokeless Tobacco     Never     History   Drug Use Unknown     Social History    Substance and Sexual Activity      Alcohol use: No      Allergies:  ?   No Known Allergies     Medications:    Current Outpatient Medications   Medication     albuterol (PROAIR HFA/PROVENTIL HFA/VENTOLIN HFA) 108 (90 Base) MCG/ACT inhaler     cetirizine (ZYRTEC) 10 MG tablet     imiquimod 3.75 % CREA     No current facility-administered medications for this visit.       Physical Exam:  ?  Vitals:  Wt 78.9 kg (174 lb)   BMI 25.68 kg/m     General:  WD/WN, in NAD.  A&O x3.  Dermatologic:    Skin is intact, open lesions absent.   Bruising present medial > lateral ankle L.  Skin texture, turgor is normal.  Vascular:  Pulses palpable bilateral.  Digital capillary refill time normal bilateral.  Skin temperature is warm L  Generalized edema- none bilateral..  Focal edema- moderate anterior medial ankle joint, L.  Neurologic:    Gross sensation normal.  Gait and balance abnormal, antalgic, L.  Musculoskeletal:  Maximal pain to palpation of anterior colliculus, medial malleolus over superficial deltoid, L.  mild pain to palpation of ATFL / lateral ankle gutter, L  No pain to palpation AITFL, CFL, peroneals, L  Ankle ROM full, painful  L.  Guards to drawer exam, L  Anterior drawer stable right.  Talar tilt stable right.  Tib / Fib squeeze test pain free left.    Muscle strength 5/5  to uninjured extremity, guarding of injured extremity.     Patient is able to bear weight on the injured extremity.  Patient is able to walk on the injured extremity.    Imaging:  x-ray independently reviewed and interpreted by myself today.  Weight-bearing views left ankle dated 04/21/23, reveal anterior colliculus  avulsion Fx, mortise with 1mm overlap on AP, none on MO.  Flatfoot without degeneration.              Again, thank you for allowing me to participate in the care of your patient.        Sincerely,        Aundrea Schwartz DPM

## 2023-04-21 NOTE — PATIENT INSTRUCTIONS
PATIENT INSTRUCTIONS - Podiatry / Foot & Ankle Surgery            Note for work - boot x6 weeks      Boot Immobilization:  size 10 tall Aircast  Wear the boot at all times when walking or standing.  Wear the boot for 4 weeks.  You may remove the boot when at rest for ankle and toe motion & for bathing/showering.  Do not drive in the boot; wear the boot to & from the car, then switch to a stiff soled shoe for driving.  You do not need to sleep in the boot.  Wear compression (ACE bandage or Tubigrip) under the boot to decrease swelling.  Minimal activity in the boot until follow-up.        Imaging  An advanced imaging study has been ordered for you today MRI  MRI:  Recommend 3T MRI at [facility] or [facility].    Please call radiology to schedule your study:      [facility] ([place] and [place] clinics and surgery centers): [phone]    [facility] (both [place] [facility])  [facility] - [facility], including [facility] CUSTOM FOOT ORTHOTICS LOCATIONS  [facility]  [address]  [place] [address]  Phone: [phone]  Fax: [phone] [facility]  [address]  [place] [address]  Phone: [phone]   Fax: [phone]   [facility]  [address]  [place] [address]  Phone: [phone]  Fax: [phone] [facility]  [address]  [place] [address]  Phone: [phone]   Fax: [phone]   [facility]   [address]  [place] [address]  Phone: [phone]  Fax: [phone] * Please call any location listed to make an appointment for a casting/fitting. Your referral was sent to their central office and they will all have the order on file.

## 2023-05-03 ENCOUNTER — TELEPHONE (OUTPATIENT)
Dept: INTERNAL MEDICINE | Facility: CLINIC | Age: 23
End: 2023-05-03

## 2023-05-03 ENCOUNTER — OFFICE VISIT (OUTPATIENT)
Dept: INTERNAL MEDICINE | Facility: CLINIC | Age: 23
End: 2023-05-03
Payer: COMMERCIAL

## 2023-05-03 VITALS
SYSTOLIC BLOOD PRESSURE: 132 MMHG | OXYGEN SATURATION: 99 % | DIASTOLIC BLOOD PRESSURE: 82 MMHG | BODY MASS INDEX: 25.86 KG/M2 | HEART RATE: 66 BPM | WEIGHT: 175.2 LBS

## 2023-05-03 DIAGNOSIS — A63.0 GENITAL WARTS: ICD-10-CM

## 2023-05-03 PROCEDURE — 99213 OFFICE O/P EST LOW 20 MIN: CPT | Performed by: PEDIATRICS

## 2023-05-03 RX ORDER — IMIQUIMOD 37.5 MG/G
CREAM TOPICAL DAILY
Qty: 7.5 G | Refills: 4 | Status: SHIPPED | OUTPATIENT
Start: 2023-05-03 | End: 2023-05-03

## 2023-05-03 RX ORDER — IMIQUIMOD 12.5 MG/.25G
CREAM TOPICAL
Qty: 12 PACKET | Refills: 3 | Status: SHIPPED | OUTPATIENT
Start: 2023-05-03

## 2023-05-03 NOTE — NURSING NOTE
Samson Morales is a 22 year old male that presents in clinic today for the following:     Chief Complaint   Patient presents with     STD     Pt here to discuss warts on genitals and screening for std's       The patient's allergies and medications were reviewed. The patient's vitals were obtained without incident. The patient does not have any other questions for the provider.     Santy Duncan, EMT at 10:47 AM on 5/3/2023.  Primary Care Clinic: [phone]

## 2023-05-03 NOTE — TELEPHONE ENCOUNTER
Received call from [facility] retail pharmacy about the following medication:     Outpatient Medication Detail     Disp Refills Start End KRISTINE   imiquimod 3.75 % CREA 7.5 g 4 5/3/2023  No   Sig - Route: Externally apply topically daily - Apply externally   Sent to pharmacy as: Imiquimod 3.75 % External Cream   Class: E-Prescribe   Order: [id number]   E-Prescribing Status: Receipt confirmed by pharmacy (5/3/2023 10:58 AM CDT)     Pharmacy stated they do not carry and can't order this strength. They stated they do have the medication in 5% form and it comes in packets, is typically used 3 times per week (?).     Garett CAMPOS, EMT at 1:55 PM on 5/3/2023.  Primary Care Clinic: [phone]

## 2023-05-03 NOTE — PROGRESS NOTES
"Dear patient. Thank you for visiting with me. I want you to feel respected, understood, and empowered. \"Respect\" is valuing you as much as I would a close family member. \"Empowerment\" happens when you are fully informed, and can make the best possible decision for you.  Please ask me questions!  Challenge anything that is not clear.    Medical records are primarily used as memory aids for me and my colleagues. Things to know about my documentation style:  - The 'problem list' includes current symptoms or diagnoses, and some problems that are resolved but may return. I use the past medical history for problems not expected to return.  - I use single quotation marks for things that you or I said, when I want to clarify who was speaking.  - I use double quotation marks when copying a term from elsewhere in your records. Italics (besides here) may also denote a quotation.  If you have questions or concerns, please contact me; I will reply as soon as time allows.    Subjective     Samson Morales is a 22 year old man, with concerns including:  Chief Complaint   Patient presents with     STD     Pt here to discuss warts on genitals and screening for std's     PCP: Clinic - Lee Babb MD   Visit type: focused problem-oriented    Patient was seen 01/19/2023 by Dr. Urban for Genital Warts and given Imiquimod 3.75% cream for 8 weeks.  He was instructed that if no improvement, the plan was to refer to Dermatology.    He reports he did not take Imiquimod 3.75% cream consistently until 3 weeks ago, now taking qd for approximately 8h at a time.  He states he has noticed slight improvement and denies blisters or drainage. He has a Dermatology appointment coming up this month (05/06/2023 with Kalyn Amos).  He also reports itching and discomfort, and asked clarification regarding masturbation and friction exacerbating symptoms.        Objective     /82 (BP Location: Right arm, Patient Position: Sitting, Cuff " Size: Adult Regular)   Pulse 66   Wt 79.5 kg (175 lb 3.2 oz)   SpO2 99%   BMI 25.86 kg/m      Physical Exam  Genitourinary:     Penis: Lesions (multiple flattened lesions that appear partially treated over glans, rim, and part of the distal shaft) present.       Comments: irregularities in scrotal tissue; do not appear to be varicose  no vesicles, no signs of super infection or excoriation               Assessment & Plan   Genital Warts:  - Partially treated  - Recommending patient continue with Imiquimod 3.75% cream for 8 full weeks as originally reocmmended and follow up with Dermatology.    - A follow up exam will likely be necessary this summer to ensure there is no recurrence.  - Discussed OTC local anisthetic spray for itching and discomfort. Advised patient on when and how to use.  - Advised against masturbation to avoid spread of lesions and friction exacerbating symptoms.  - Discussed risk of penile cancer and how to follow up      Scribe Disclosure:   I, KIRBY CHANELLE, am serving as a scribe; to document services personally performed by Lee Babb MD -based on data collection and the provider's statements to me.     Provider Disclosure:  I agree with above History, Review of Systems, Physical exam and Plan.  I have reviewed the content of the documentation and have edited it as needed. I have personally performed the services documented here and the documentation accurately represents those services and the decisions I have made.      Electronically signed by:  Lee Babb MD       About this visit:  Time note (e3, 20'): The total time (on the date of service) for this service was 25 minutes, including discussion/face-to-face, chart review, interpretation not otherwise reported, documentation, and updating of the computerized record.

## 2023-05-06 ENCOUNTER — ANCILLARY PROCEDURE (OUTPATIENT)
Dept: MRI IMAGING | Facility: CLINIC | Age: 23
End: 2023-05-06
Attending: PODIATRIST
Payer: COMMERCIAL

## 2023-05-06 DIAGNOSIS — S93.492A SPRAIN OF ANTERIOR TALOFIBULAR LIGAMENT, LEFT, INITIAL ENCOUNTER: ICD-10-CM

## 2023-05-06 DIAGNOSIS — S93.422A SPRAIN OF DELTOID LIGAMENT OF LEFT ANKLE, INITIAL ENCOUNTER: ICD-10-CM

## 2023-05-06 DIAGNOSIS — M25.372 ANKLE INSTABILITY, LEFT: ICD-10-CM

## 2023-05-06 PROCEDURE — 73721 MRI JNT OF LWR EXTRE W/O DYE: CPT | Mod: LT | Performed by: RADIOLOGY

## 2023-05-09 ENCOUNTER — MYC MEDICAL ADVICE (OUTPATIENT)
Dept: PODIATRY | Facility: CLINIC | Age: 23
End: 2023-05-09
Payer: COMMERCIAL

## 2023-05-09 NOTE — TELEPHONE ENCOUNTER
Please call patient and have him follow-up Fri at [facility] for his ankle MRI.    Aundrea Schwartz DPM

## 2023-05-09 NOTE — TELEPHONE ENCOUNTER
Called; spoke with patient and got him scheduled for 1045 at [facility] on Friday.     DENA Funes

## 2023-05-12 ENCOUNTER — OFFICE VISIT (OUTPATIENT)
Dept: PODIATRY | Facility: CLINIC | Age: 23
End: 2023-05-12
Payer: COMMERCIAL

## 2023-05-12 VITALS — OXYGEN SATURATION: 100 % | BODY MASS INDEX: 25.83 KG/M2 | HEART RATE: 66 BPM | WEIGHT: 175 LBS

## 2023-05-12 DIAGNOSIS — S93.422A SPRAIN OF DELTOID LIGAMENT OF LEFT ANKLE, INITIAL ENCOUNTER: Primary | ICD-10-CM

## 2023-05-12 DIAGNOSIS — M95.8 OSTEOCHONDRAL DEFECT OF ANKLE: ICD-10-CM

## 2023-05-12 DIAGNOSIS — M25.372 ANKLE INSTABILITY, LEFT: ICD-10-CM

## 2023-05-12 PROCEDURE — 99214 OFFICE O/P EST MOD 30 MIN: CPT | Performed by: PODIATRIST

## 2023-05-12 NOTE — PROGRESS NOTES
Assessment:      ICD-10-CM    1. Sprain of deltoid ligament of left ankle, initial encounter  S93.422A Physical Therapy Referral      2. Ankle instability, left  M25.372 Physical Therapy Referral      3. Osteochondral defect of ankle  M95.8 Physical Therapy Referral          Plan:  Orders Placed This Encounter   Procedures     Physical Therapy Referral       Discussed the etiology and treatment of the condition with the patient.  Imaging studies reviewed and discussed with the patient.  Discussed surgical and conservative options.      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    Aundrea Schwartz DPM                Chief Complaint:     Patient presents with:  Left Ankle - RECHECK     left ankle injury.    HPI:  Samson Morales is a 22 year old year old male who presents for evaluation of ankle injury.    5/12/23  Pt states he has sprained L ankle at least twice in the past; has never sprained R  Has been in the boot since LOV  Can walk at home without boot but not out of home      Date of injury- 4/19/23  Mechanism of injury- sprained playing basketball  Pain location- medial ankle     Pt states he has sprained the L ankle 3 times previously.  He has never had it this swollen before  States he has flat feet      Past Medical & Surgical History:  Past Medical History:   Diagnosis Date     Eczema      Seasonal allergies       No past surgical history on file.   No family history on file.     Social History:  ?  History   Smoking  Status     Never   Smokeless Tobacco     Never     History   Drug Use Unknown     Social History    Substance and Sexual Activity      Alcohol use: No      Allergies:  ?   No Known Allergies     Medications:    Current Outpatient Medications   Medication     albuterol (PROAIR HFA/PROVENTIL HFA/VENTOLIN HFA) 108 (90 Base) MCG/ACT inhaler     cetirizine (ZYRTEC) 10 MG tablet     imiquimod (ALDARA) 5 % external cream     No current facility-administered medications for this visit.       Physical Exam:  ?  Vitals:  Pulse 66   Wt 79.4 kg (175 lb)   SpO2 100%   BMI 25.83 kg/m     General:  WD/WN, in NAD.  A&O x3.  Dermatologic:    Skin is intact, open lesions absent.   Bruising present medial > lateral ankle L.  Skin texture, turgor is normal.  Vascular:  Pulses palpable bilateral.  Digital capillary refill time normal bilateral.  Skin temperature is warm L  Generalized edema- none bilateral..  Focal edema- moderate anterior medial ankle joint, L.  Neurologic:    Gross sensation normal.  Gait and balance abnormal, antalgic, L.  Musculoskeletal:  Moderate pain to palpation of anterior colliculus, medial malleolus over superficial deltoid, L.  mild pain to palpation of ATFL / lateral ankle gutter, L  No pain to palpation AITFL, CFL, peroneals, L  Ankle ROM full, painful  L.  Ankle anterior drawer / talar tilt unstable,  left, vs R  Deltoid drawer - increased L vs R    Muscle strength 5/5  to uninjured extremity, guarding of injured extremity.     RCSP-   Valgus bilateral      Imaging:  MRI independently reviewed and interpreted by myself today.   left ankle dated 5/2023, reveal complete deltoid rupture including avulsion from medial malleolus - in area of Fracture- and rupture of deep deltoid at talar body insertion.  Medial talar dome osteochondral defect - ~5mm deep.  Moderate attenuation spring ligament  Lateral ligament attenuation      x-ray independently reviewed and interpreted by myself today.  Weight-bearing  views left ankle dated 04/21/23, reveal anterior colliculus avulsion Fx, mortise with 1mm overlap on AP, none on MO.  Flatfoot without degeneration.

## 2023-05-12 NOTE — PATIENT INSTRUCTIONS
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  Wear the boot for 4-6 weeks total.  You may remove the boot when at rest for ankle and toe motion & for bathing/showering.  Do not drive in the boot; wear the boot to & from the car, then switch to a stiff soled shoe for driving.  You do not need to sleep in the boot.  Wear compression (ACE bandage or Tubigrip) under the boot to decrease swelling.  Minimal activity in the boot     Then use the ankle brace after the boot when active        Surgery:  Ankle arthroscopy and microfracture / grafting OCD talus; Deltoid ligament repair, possible Brostrom Calvin lateral ligament repair   Recovery - 6 weeks no weight, then 2-4 weeks in a boot      Surgery Scheduling  Please call the surgery coordinator to schedule surgery at:  [phone]  The surgery coordinator will also arrange a pre-op exam with your PCP for surgical clearance.  Please return to see your surgeon for a pre-op exam within 30 days of your scheduled procedure.

## 2023-05-12 NOTE — LETTER
5/12/2023         RE: Samson Morales  [address]  [place] [facility] [place] [address]        Dear Colleague,    Thank you for referring your patient, Samson Morales, to the [facility]. Please see a copy of my visit note below.    Assessment:      ICD-10-CM    1. Sprain of deltoid ligament of left ankle, initial encounter  S93.422A Physical Therapy Referral      2. Ankle instability, left  M25.372 Physical Therapy Referral      3. Osteochondral defect of ankle  M95.8 Physical Therapy Referral          Plan:  Orders Placed This Encounter   Procedures     Physical Therapy Referral       Discussed the etiology and treatment of the condition with the patient.  Imaging studies reviewed and discussed with the patient.  Discussed surgical and conservative options.      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    Aundrea Schwartz DPM                Chief Complaint:     Patient presents with:  Left Ankle - RECHECK     left ankle injury.    HPI:  Samson Morales is a 22 year old year old male who presents for evaluation of ankle injury.    5/12/23  Pt states he has sprained L ankle at least twice in the past; has never sprained R  Has been in the boot since LOV  Can walk at home without boot but not out of home      Date of injury- 4/19/23  Mechanism of injury- sprained playing basketball  Pain location- medial ankle     Pt states he has sprained the L ankle 3 times previously.  He has never had it this swollen before  States he  has flat feet      Past Medical & Surgical History:  Past Medical History:   Diagnosis Date     Eczema      Seasonal allergies       No past surgical history on file.   No family history on file.     Social History:  ?  History   Smoking Status     Never   Smokeless Tobacco     Never     History   Drug Use Unknown     Social History    Substance and Sexual Activity      Alcohol use: No      Allergies:  ?   No Known Allergies     Medications:    Current Outpatient Medications   Medication     albuterol (PROAIR HFA/PROVENTIL HFA/VENTOLIN HFA) 108 (90 Base) MCG/ACT inhaler     cetirizine (ZYRTEC) 10 MG tablet     imiquimod (ALDARA) 5 % external cream     No current facility-administered medications for this visit.       Physical Exam:  ?  Vitals:  Pulse 66   Wt 79.4 kg (175 lb)   SpO2 100%   BMI 25.83 kg/m     General:  WD/WN, in NAD.  A&O x3.  Dermatologic:    Skin is intact, open lesions absent.   Bruising present medial > lateral ankle L.  Skin texture, turgor is normal.  Vascular:  Pulses palpable bilateral.  Digital capillary refill time normal bilateral.  Skin temperature is warm L  Generalized edema- none bilateral..  Focal edema- moderate anterior medial ankle joint, L.  Neurologic:    Gross sensation normal.  Gait and balance abnormal, antalgic, L.  Musculoskeletal:  Moderate pain to palpation of anterior colliculus, medial malleolus over superficial deltoid, L.  mild pain to palpation of ATFL / lateral ankle gutter, L  No pain to palpation AITFL, CFL, peroneals, L  Ankle ROM full, painful  L.  Ankle anterior drawer / talar tilt unstable,  left, vs R  Deltoid drawer - increased L vs R    Muscle strength 5/5  to uninjured extremity, guarding of injured extremity.     RCSP-   Valgus bilateral      Imaging:  MRI independently reviewed and interpreted by myself today.   left ankle dated 5/2023, reveal complete deltoid rupture including avulsion from medial malleolus - in area of Fracture- and rupture of deep  deltoid at talar body insertion.  Medial talar dome osteochondral defect - ~5mm deep.  Moderate attenuation spring ligament  Lateral ligament attenuation      x-ray independently reviewed and interpreted by myself today.  Weight-bearing views left ankle dated 04/21/23, reveal anterior colliculus avulsion Fx, mortise with 1mm overlap on AP, none on MO.  Flatfoot without degeneration.            Again, thank you for allowing me to participate in the care of your patient.        Sincerely,        Aundrea Schwartz DPM

## 2023-05-16 ENCOUNTER — LAB (OUTPATIENT)
Dept: LAB | Facility: CLINIC | Age: 23
End: 2023-05-16
Payer: COMMERCIAL

## 2023-05-16 ENCOUNTER — OFFICE VISIT (OUTPATIENT)
Dept: INTERNAL MEDICINE | Facility: CLINIC | Age: 23
End: 2023-05-16
Payer: COMMERCIAL

## 2023-05-16 VITALS
HEART RATE: 64 BPM | OXYGEN SATURATION: 99 % | HEIGHT: 69 IN | WEIGHT: 174.1 LBS | DIASTOLIC BLOOD PRESSURE: 85 MMHG | BODY MASS INDEX: 25.79 KG/M2 | SYSTOLIC BLOOD PRESSURE: 144 MMHG

## 2023-05-16 DIAGNOSIS — A63.0 GENITAL WARTS: Primary | ICD-10-CM

## 2023-05-16 DIAGNOSIS — A63.0 GENITAL WARTS: ICD-10-CM

## 2023-05-16 LAB — T PALLIDUM AB SER QL: NONREACTIVE

## 2023-05-16 PROCEDURE — 87591 N.GONORRHOEAE DNA AMP PROB: CPT | Mod: 90 | Performed by: PATHOLOGY

## 2023-05-16 PROCEDURE — 36415 COLL VENOUS BLD VENIPUNCTURE: CPT | Performed by: PATHOLOGY

## 2023-05-16 PROCEDURE — 87491 CHLMYD TRACH DNA AMP PROBE: CPT | Mod: 90 | Performed by: PATHOLOGY

## 2023-05-16 PROCEDURE — 87389 HIV-1 AG W/HIV-1&-2 AB AG IA: CPT | Mod: 90 | Performed by: PATHOLOGY

## 2023-05-16 PROCEDURE — 99213 OFFICE O/P EST LOW 20 MIN: CPT | Mod: GE | Performed by: STUDENT IN AN ORGANIZED HEALTH CARE EDUCATION/TRAINING PROGRAM

## 2023-05-16 PROCEDURE — 86780 TREPONEMA PALLIDUM: CPT | Mod: 90 | Performed by: PATHOLOGY

## 2023-05-16 PROCEDURE — 86803 HEPATITIS C AB TEST: CPT | Mod: 90 | Performed by: PATHOLOGY

## 2023-05-16 PROCEDURE — 99000 SPECIMEN HANDLING OFFICE-LAB: CPT | Performed by: PATHOLOGY

## 2023-05-16 NOTE — PATIENT INSTRUCTIONS
Dear Samson,    Thank you for your visit today. We won't make changes to your current imiquamod regimen. Please follow up with Dermatology as planned next week.  We adriana screen you for sexually transmitted infections as well to ensure nothing else is at play.    We discussed abstinence at this time as well as ways to avoid spread of your lesions to other parts of your body.    Please reach out with concerns or questions.    Thanks,    JOSEPHINE

## 2023-05-16 NOTE — PROGRESS NOTES
"  PRIMARY [facility]ARE CENTER         HPI:       Samson Morales is a 22 year old male who presents to clinic for: Wart (Pt here for genital warts for past 1-2 months that has resolved. Pt states that top layer of skin on genitals has ripped off and that area sometimes bleeds.)    22-year-old man with no significant PMH presents to clinic for follow up of penile lesion.    Lesion was first noted in April as \"a white dot\" and subsequently evolved and presently appears a desquamated area nearly cirmcumferentially around his glans. He started topical imiquimod first daily and now 3 times a week since early May.    No STI testing available on chart review.    He has a plan to see Dermatology next week for potential additional therapies.    Pics obtained and available on Media tab.    PMHx:  Past Medical History:   Diagnosis Date    Eczema     Seasonal allergies        PSHx:  No past surgical history on file.    FamHx:  No family history on file.    Allergies:   No Known Allergies    Meds:    Current Outpatient Medications:     albuterol (PROAIR HFA/PROVENTIL HFA/VENTOLIN HFA) 108 (90 Base) MCG/ACT inhaler, Inhale 2 puffs into the lungs every 4 hours as needed for shortness of breath, wheezing or cough, Disp: 18 g, Rfl: 1    imiquimod (ALDARA) 5 % external cream, Apply a small sized amount to warts or molluscum three times weekly at bedtime.   Wash off after 8 hours.   May use for up to 16 weeks., Disp: 12 packet, Rfl: 3    cetirizine (ZYRTEC) 10 MG tablet, Take 10 mg by mouth daily (Patient not taking: Reported on 5/16/2023), Disp: , Rfl:     SocHx:  Social History     Socioeconomic History    Marital status: Single   Tobacco Use    Smoking status: Never    Smokeless tobacco: Never   Substance and Sexual Activity    Alcohol use: No    Drug use: Never    Sexual activity: Never       Problem, Medication and Allergy Lists were reviewed and are current.  Patient is an established patient of this clinic.         Review of Systems: " "    ROS  I have personally reviewed and updated the complete ROS on the day of the visit.           Physical Exam:   BP (!) 144/85 (BP Location: Right arm, Patient Position: Sitting, Cuff Size: Adult Regular)   Pulse 64   Ht 1.753 m (5' 9.02\")   Wt 79 kg (174 lb 1.6 oz)   SpO2 99%   BMI 25.70 kg/m    Body mass index is 25.7 kg/m .  Vitals were reviewed        GENERAL APPEARANCE: healthy, alert and no distress     RESP: lungs clear to auscultation - no rales, rhonchi or wheezes     CV: regular rates and rhythm, normal S1 S2, no S3 or S4 and no murmur, click or rub     SKIN: genital skin inspected and notale for desquamation near circumferentially. Shaft and testes appear unaffected     NEURO: Normal strength and tone, sensory exam grossly normal, mentation intact and speech normal     PSYCH: mentation appears normal. and affect normal/bright     LYMPHATICS: No cervical adenopathy        Results:     Lab on 07/25/2022   Component Date Value Ref Range Status    WBC Count 07/25/2022 5.2  4.0 - 11.0 10e3/uL Final    RBC Count 07/25/2022 5.51  4.40 - 5.90 10e6/uL Final    Hemoglobin 07/25/2022 14.1  13.3 - 17.7 g/dL Final    Hematocrit 07/25/2022 43.9  40.0 - 53.0 % Final    MCV 07/25/2022 80  78 - 100 fL Final    MCH 07/25/2022 25.6 (L)  26.5 - 33.0 pg Final    MCHC 07/25/2022 32.1  31.5 - 36.5 g/dL Final    RDW 07/25/2022 12.9  10.0 - 15.0 % Final    Platelet Count 07/25/2022 300  150 - 450 10e3/uL Final    Sodium 07/25/2022 131 (L)  133 - 144 mmol/L Final    Potassium 07/25/2022 4.3  3.4 - 5.3 mmol/L Final    Chloride 07/25/2022 105  98 - 110 mmol/L Final    Carbon Dioxide (CO2) 07/25/2022 29  20 - 32 mmol/L Final    Anion Gap 07/25/2022 <1 (L)  3 - 14 mmol/L Final    Urea Nitrogen 07/25/2022 14  7 - 30 mg/dL Final    Creatinine 07/25/2022 0.88  0.50 - 1.00 mg/dL Final    Calcium 07/25/2022 9.6  8.5 - 10.1 mg/dL Final    Glucose 07/25/2022 80  70 - 99 mg/dL Final    GFR Estimate 07/25/2022 >90  >60 mL/min/1.73m2 " Final    Effective December 21, 2021 eGFRcr in adults is calculated using the 2021 CKD-EPI creatinine equation which includes age and gender (Breanne et al., NEJ, DOI: 10.1056/VRHIex9542789)       Lab Results   Component Value Date    WBC 5.2 07/25/2022    HGB 14.1 07/25/2022    HCT 43.9 07/25/2022     07/25/2022     (L) 07/25/2022    POTASSIUM 4.3 07/25/2022    CHLORIDE 105 07/25/2022    CO2 29 07/25/2022    BUN 14 07/25/2022    CR 0.88 07/25/2022    GLC 80 07/25/2022       Assessment and Plan     Samson was seen today for wart.    Diagnoses and all orders for this visit:    Genital warts  -     Treponema Abs w Reflex to RPR and Titer; Future  -     NEISSERIA GONORRHOEA PCR; Future  -     CHLAMYDIA TRACHOMATIS PCR; Future  -     HIV Antigen Antibody Combo; Future  -     Hepatitis C Screen Reflex to HCV RNA Quant and Genotype; Future      Will formally test for the above since these are not visible on chart review. Received HPV vaccine  Completed 3/3 shots in 2016.     Use of condoms and other ways to make sex safer were discussed at length.    Follow up with Dermatology for potential additional treatment options on May 24 2023.    Options for treatment and follow-up care were reviewed with the patient. Samson Morales engaged in the decision making process and verbalized understanding of the options discussed and agreed with the final plan.    Jan Reyes  Internal Medicine, PGY-3  [facility]  Pager: [phone]    May 16, 2023    Pt was seen and plan of care discussed with Dr Jeronimo.     While the patient was in clinic, I reviewed the pertinent medical history and results.  I discussed the current findings on physical examination, as well as the patient s diagnosis and treatment plan with the resident and agree with the information as documented with the following exceptions: none.  Padmini Jeronimo MD  Internal Medicine

## 2023-05-16 NOTE — NURSING NOTE
"Samson Morales is a 22 year old male patient that presents today in clinic for the following:    Chief Complaint   Patient presents with     Wart     Pt here for genital warts for past 1-2 months that has resolved. Pt states that top layer of skin on genitals has ripped off and that area sometimes bleeds.     The patient's allergies and medications were reviewed as noted. A set of vitals were recorded as noted without incident: BP (!) 144/85 (BP Location: Right arm, Patient Position: Sitting, Cuff Size: Adult Regular)   Pulse 64   Ht 1.753 m (5' 9.02\")   Wt 79 kg (174 lb 1.6 oz)   SpO2 99%   BMI 25.70 kg/m  . The patient does not have any other questions for the provider.    Kaila Washington, EMT at 12:50 PM on 5/16/2023  "

## 2023-05-17 LAB
C TRACH DNA SPEC QL NAA+PROBE: NEGATIVE
HCV AB SERPL QL IA: NONREACTIVE
HIV 1+2 AB+HIV1 P24 AG SERPL QL IA: NONREACTIVE
N GONORRHOEA DNA SPEC QL NAA+PROBE: NEGATIVE

## 2023-05-24 ENCOUNTER — OFFICE VISIT (OUTPATIENT)
Dept: FAMILY MEDICINE | Facility: CLINIC | Age: 23
End: 2023-05-24
Attending: INTERNAL MEDICINE
Payer: COMMERCIAL

## 2023-05-24 DIAGNOSIS — L98.9 SKIN EROSION: ICD-10-CM

## 2023-05-24 DIAGNOSIS — B07.9 VERRUCA VULGARIS: Primary | ICD-10-CM

## 2023-05-24 PROCEDURE — 99203 OFFICE O/P NEW LOW 30 MIN: CPT | Mod: 25 | Performed by: NURSE PRACTITIONER

## 2023-05-24 PROCEDURE — 17110 DESTRUCTION B9 LES UP TO 14: CPT | Performed by: NURSE PRACTITIONER

## 2023-05-24 ASSESSMENT — PAIN SCALES - GENERAL: PAINLEVEL: NO PAIN (0)

## 2023-05-24 NOTE — PROGRESS NOTES
[facility] Dermatology Note  Encounter Date: May 24, 2023  Office Visit     Reviewed patients past medical history and pertinent chart review prior to patients visit today.     Dermatology Problem List:  Warts, right hand. 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 Follow up for recheck 2 months.   ____________________________________________    Assessment & Plan:     #Genital erosion and hypopigmentation  - Advised that he stop using Aldara and that he instead use a moisturizer or Vasoline.  Discontinue use of numbing spray.  Advised that he monitor lesion and follow-up in 2 months to reevaluate when lesion is not as irritated. I am unsure by exam and history if he had genital warts that have since resolved or something else. He says there was only lightened pigmentation and no raised papules which argues against HPV. I would like to recheck at follow up after irritation is fully healed    # Verruca vulgaris, right thumb and second finger  Counseled on natural history and viral etiology of this condition. Counseled that these are often recalcitrant to treatment. Discussed all treatment options that we offer as well as side effects of each. Advised that highest rates of success can be observed with combination monthly treatment in office and home treatments as well  - Cryotherapy: Patient elects to treat warts today with cryotherapy. After verbal consent and discussion of risks and benefits including but no limited to dyspigmentation/scar, blister, and pain. A total of 2 warts were treated with 1-2mm freeze border for 3 cycle with liquid nitrogen. Post cryotherapy instructions were provided.   - Start salicylic acid 40% (such as wart stick) nightly followed by duct tape or bandage.  Pare down after removal of occlusive cover for deeper penetration of medication.     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 Samson Morales is a(n) 22 year old male who presents today as a new patient for evaluation for genital warts.  These warts have been present since early April.  He has been using Aldara 5% external cream which has caused a sore on the penis.  He used to have pain when on the medication, especially when walking, but this has resolved.  He is not currently sexually active.  He has received negative results after STI testing.  He describes the wart as white and pale in color and not raised prior to starting treatment.  He believes that he did irritate the lesion when he masturbated.  He has had warts to the right thumb and third finger which he has received cryotherapy for but are still present.  He is interested in receiving repeat cryotherapy today.     Patient is otherwise feeling well, without additional skin concerns.      Physical Exam:  Vitals: There were no vitals taken for this visit.  SKIN: Focused examination of hands and genital region was performed.  - There are two 2 mm verrucous papules with thrombosed capillaries and interruption of dermatoglyphics on the right thumb and right tip of the second finger  -1.5 cm erosion on the dorsum glands penis.  A Few areas of hypopigmentation on the glands penis.   -No depigmentation of the back of the elbows and hands.    - No other lesions of concern on areas examined.     Medications:  Current Outpatient Medications   Medication     albuterol (PROAIR HFA/PROVENTIL HFA/VENTOLIN HFA) 108 (90 Base) MCG/ACT inhaler     imiquimod (ALDARA) 5 % external cream     cetirizine (ZYRTEC) 10 MG tablet     No current facility-administered medications for this visit.      Past Medical History:   Patient Active Problem List   Diagnosis     Environmental allergies      IMMUNIZATION HISTORY     Nutritional deficiency     Vision problem     Chronic allergic conjunctivitis     Episodic tension-type headache, not intractable     Allergic rhinitis due to pollen     Instability of right shoulder joint     Instability of both shoulder joints     Preop general physical exam     Past Medical History:   Diagnosis Date     Eczema      Seasonal allergies        CC Elsy Guerrero MD  [address] [facility] [address]  [place] [address] on close of this encounter.

## 2023-05-24 NOTE — LETTER
5/24/2023         RE: Samson Morales  [address] [facility] [address]        Dear Colleague,    Thank you for referring your patient, Samson Morales, to the [facility] [place]. Please see a copy of my visit note below.    [facility] Dermatology Note  Encounter Date: May 24, 2023  Office Visit     Reviewed patients past medical history and pertinent chart review prior to patients visit today.     Dermatology Problem List:  Warts, right hand. 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 Follow up for recheck 2 months.   ____________________________________________    Assessment & Plan:     #Genital erosion and hypopigmentation  - Advised that he stop using Aldara and that he instead use a moisturizer or Vasoline.  Discontinue use of numbing spray.  Advised that he monitor lesion and follow-up in 2 months to reevaluate when lesion is not as irritated. I am unsure by exam and history if he had genital warts that have since resolved or something else. He says there was only lightened pigmentation and no raised papules which argues against HPV. I would like to recheck at follow up after irritation is fully healed    # Verruca vulgaris, right thumb and second finger  Counseled on natural history and viral etiology of this condition. Counseled that these are often recalcitrant to treatment. Discussed all treatment options that we offer as well as side effects of each. Advised that highest rates of success can be observed with combination monthly treatment in office and home treatments as well  - Cryotherapy: Patient elects to treat warts today with cryotherapy. After verbal consent and discussion of risks and benefits including but no limited to dyspigmentation/scar, blister, and pain. A total of 2 warts were treated with 1-2mm freeze border for 3 cycle with liquid nitrogen. Post cryotherapy instructions were provided.   - Start salicylic acid 40% (such as wart stick)  nightly followed by duct tape or bandage.  Pare down after removal of occlusive cover for deeper penetration of medication.     Follow-up: 2 months to reevaluate genital lesion or earlier for new or changing lesions    Written by Georgia Townsend working as a scribe for CE Guerrier CNP on 05/24/23    Melia Amos CNP  Dermatology   _______________________________________    CC: Derm Problem (Here to follow up on genital warts)    HPI:  Mr. Samson Morales is a(n) 22 year old male who presents today as a new patient for evaluation for genital warts.  These warts have been present since early April.  He has been using Aldara 5% external cream which has caused a sore on the penis.  He used to have pain when on the medication, especially when walking, but this has resolved.  He is not currently sexually active.  He has received negative results after STI testing.  He describes the wart as white and pale in color and not raised prior to starting treatment.  He believes that he did irritate the lesion when he masturbated.  He has had warts to the right thumb and third finger which he has received cryotherapy for but are still present.  He is interested in receiving repeat cryotherapy today.     Patient is otherwise feeling well, without additional skin concerns.      Physical Exam:  Vitals: There were no vitals taken for this visit.  SKIN: Focused examination of hands and genital region was performed.  - There are two 2 mm verrucous papules with thrombosed capillaries and interruption of dermatoglyphics on the right thumb and right tip of the second finger  -1.5 cm erosion on the dorsum glands penis.  A Few areas of hypopigmentation on the glands penis.   -No depigmentation of the back of the elbows and hands.    - No other lesions of concern on areas examined.     Medications:  Current Outpatient Medications   Medication     albuterol (PROAIR HFA/PROVENTIL HFA/VENTOLIN HFA) 108 (90 Base) MCG/ACT inhaler      imiquimod (ALDARA) 5 % external cream     cetirizine (ZYRTEC) 10 MG tablet     No current facility-administered medications for this visit.      Past Medical History:   Patient Active Problem List   Diagnosis     Environmental allergies     IMMUNIZATION HISTORY     Nutritional deficiency     Vision problem     Chronic allergic conjunctivitis     Episodic tension-type headache, not intractable     Allergic rhinitis due to pollen     Instability of right shoulder joint     Instability of both shoulder joints     Preop general physical exam     Past Medical History:   Diagnosis Date     Eczema      Seasonal allergies        CC Elsy Guerrero MD  [address] [address] on close of this encounter.         Again, thank you for allowing me to participate in the care of your patient.        Sincerely,        CE Roberson CNP

## 2023-05-30 ENCOUNTER — THERAPY VISIT (OUTPATIENT)
Dept: PHYSICAL THERAPY | Facility: CLINIC | Age: 23
End: 2023-05-30
Attending: PODIATRIST
Payer: COMMERCIAL

## 2023-05-30 DIAGNOSIS — M25.572 PAIN IN JOINT INVOLVING ANKLE AND FOOT, LEFT: ICD-10-CM

## 2023-05-30 PROCEDURE — 97161 PT EVAL LOW COMPLEX 20 MIN: CPT | Mod: GP

## 2023-05-30 PROCEDURE — 97110 THERAPEUTIC EXERCISES: CPT | Mod: GP

## 2023-05-30 NOTE — PROGRESS NOTES
PHYSICAL THERAPY EVALUATION  Type of Visit: Evaluation    See electronic medical record for Abuse and Falls Screening details.    Subjective      Presenting condition or subjective complaint: L ankle pain after basketball injury.  Date of onset: 04/19/23    Relevant medical history:   3 left lateral ankle sprains  Dates & types of surgery:      Prior diagnostic imaging/testing results: X-ray   MRI  Prior therapy history for the same diagnosis, illness or injury: No          Living Environment  Social support: With family members   Type of home: [facility]   Stairs to enter the home: No   Is there a railing: No   Ramp: No   Stairs inside the home: Yes       Help at home: None  Equipment owned:       Employment: Yes , student at [employer]  Hobbies/Interests: lifting, basketball, soccer    Patient goals for therapy: Running, jumping    Pain assessment: medial ankle, pressure, 1/10       OBJECTIVE    Gait: medial arch collapse in stance, less push off L foot    Standing posture: pes planus    DL Squat: good form, limited ankle DF mobility    Single leg stance: proprioceptive challenge R>L    Edema:    Figure 8: L: 56.6  ; R: 56 cm    ROM/Strength Blank if not assessed   ROM L ROM R MMT L MMT R   Dorsiflexion  0 0 5/5 5/5   Dorsiflexion WB Soleus: 6 cm Soleus: 8 cm Difficulty performing SL heel raise Able to perform SL heel raise   Plantarflexion 55 55     Inversion 35 25 5-/5* 5/5   Eversion 30 20 5/5 5/5     Joint Mobility: Blank if not assessed   Left Right Pain Endfeel   Talocrural hypomobile      Subtalar wnl      Midfoot wnl      Ray mobility wnl          Palpation: tenderness in L deltoid ligament       Assessment & Plan   CLINICAL IMPRESSIONS   Medical Diagnosis: Sprain of deltoid ligament of left ankle    Treatment Diagnosis: L ankle pain   Impression/Assessment: Patient is a 22 year old male with left ankle complaints.  The following significant findings have been identified: Pain, Decreased  ROM/flexibility, Decreased joint mobility, Decreased strength, Impaired balance, Decreased proprioception and Impaired gait. These impairments interfere with their ability to perform self care tasks, work tasks, recreational activities, household chores, household mobility and community mobility as compared to previous level of function.     Clinical Decision Making (Complexity):   Clinical Presentation: Stable/Uncomplicated  Clinical Presentation Rationale: based on medical and personal factors listed in PT evaluation  Clinical Decision Making (Complexity): Low complexity    PLAN OF CARE  Treatment Interventions:  Modalities: Vasoneumatic Device  Interventions: Gait Training, Manual Therapy, Neuromuscular Re-education, Therapeutic Activity, Therapeutic Exercise, Self-Care/Home Management    Long Term Goals     PT Goal 1  Goal Identifier: L ankle  Goal Description: Walk 60 minutes  Rationale: to maximize safety and independence with performance of ADLs and functional tasks;to maximize safety and independence within the home;to maximize safety and independence within the community;to maximize safety and independence with transportation  Target Date: 06/30/23  PT Goal 2  Goal Identifier: L ankle  Goal Description: Return to sport- run 1 mile to promote a healthy lifestyle  Rationale: to maximize safety and independence within the community  Target Date: 08/30/23      Frequency of Treatment: 1x/week for 4 weeks, 2x/month for 2 months  Duration of Treatment: 3 months    Recommended Referrals to Other Professionals: Physical Therapy  Education Assessment:        Risks and benefits of evaluation/treatment have been explained.   Patient/Family/caregiver agrees with Plan of Care.     Evaluation Time:     PT Eval, Low Complexity Minutes (56335): 15   Present: Not applicable    Signing Clinician: Chata Garcia PT      [facility]                                                                                    OUTPATIENT PHYSICAL THERAPY      PLAN OF TREATMENT FOR OUTPATIENT REHABILITATION   Patient's Last Name, First Name, Samson Ambrose YOB: 2000   Provider's Name   [facility]   Medical Record No.  [id number]     Onset Date: 04/19/23  Start of Care Date: 05/30/23     Medical Diagnosis:  Sprain of deltoid ligament of left ankle      PT Treatment Diagnosis:  L ankle pain Plan of Treatment  Frequency/Duration: 1x/week for 4 weeks, 2x/month for 2 months/ 3 months    Certification date from 05/30/23 to 08/30/23         See note for plan of treatment details and functional goals     Chata Garcia, PT                         I CERTIFY THE NEED FOR THESE SERVICES FURNISHED UNDER        THIS PLAN OF TREATMENT AND WHILE UNDER MY CARE     (Physician attestation of this document indicates review and certification of the therapy plan).                  Referring Provider:  Aundrea Schwartz      Initial Assessment  See Epic Evaluation- Start of Care Date: 05/30/23

## 2023-07-26 ENCOUNTER — OFFICE VISIT (OUTPATIENT)
Dept: FAMILY MEDICINE | Facility: CLINIC | Age: 23
End: 2023-07-26
Payer: COMMERCIAL

## 2023-07-26 DIAGNOSIS — L81.9 HYPOPIGMENTATION: Primary | ICD-10-CM

## 2023-07-26 DIAGNOSIS — B07.9 VERRUCA VULGARIS: ICD-10-CM

## 2023-07-26 PROCEDURE — 99212 OFFICE O/P EST SF 10 MIN: CPT | Mod: 25 | Performed by: NURSE PRACTITIONER

## 2023-07-26 PROCEDURE — 17110 DESTRUCTION B9 LES UP TO 14: CPT | Performed by: NURSE PRACTITIONER

## 2023-07-26 ASSESSMENT — PAIN SCALES - GENERAL: PAINLEVEL: NO PAIN (0)

## 2023-07-26 NOTE — LETTER
7/26/2023         RE: Samson Morales  [address] [facility] [address]        Dear Colleague,    Thank you for referring your patient, Samson Morales, to the [facility] [place]. Please see a copy of my visit note below.    [facility] Dermatology Note  Encounter Date: Jul 26, 2023  Office Visit     Reviewed patients past medical history and pertinent chart review prior to patients visit today.     Dermatology Problem List:  Warts, right hand. 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 No residual genital lesions but hypopigmentation   ____________________________________________    Assessment & Plan:     #Genital hypopigmentation  - no lesions present but there is some patchy hypopigmented plaque along the glans penis. Likely postinflammatory but if symptomatic or spreading I would consider a lichen sclerosus or lichen planus process. Patient will follow up in clinic if hypopigmentation is spreading or if it becomes symptomatic.    # Verruca vulgaris, right thumb and second finger  Counseled on natural history and viral etiology of this condition. Counseled that these are often recalcitrant to treatment. Discussed all treatment options that we offer as well as side effects of each. Advised that highest rates of success can be observed with combination monthly treatment in office and home treatments as well  - Cryotherapy: Patient elects to treat warts today with cryotherapy. After verbal consent and discussion of risks and benefits including but no limited to dyspigmentation/scar, blister, and pain. A total of 2 warts were treated with 1-2mm freeze border for 3 cycle with liquid nitrogen. Post cryotherapy instructions were provided.   - Start salicylic acid 40% (such as wart stick) nightly followed by duct tape or bandage.  Pare down after removal of occlusive cover for deeper penetration of medication.     Follow-up: 1 month to retreat warts    Melia  Bette, CNP  Dermatology   _______________________________________    CC: Derm Problem (F/U genital lesions)    HPI:  Mr. Samson Morales is a(n) 22 year old male who presents today as a follow-up for warts on the hands and discoloration of the penis.  I saw him for the first time on 5/24/2023 when he was using Aldara cream that has caused a sore on the penis.  He states prior to using the medication it was a few slightly discolored areas and the cream made it have an open sore and spread.  Since discontinuing the cream all of the soreness has gone away and it is not symptomatic at all.  The discoloration is still present but patient is not bothered by the discoloration.    We also treated his warts on his hand with cryotherapy at his last visit and he would like them retreated as they are not fully resolved.      Patient is otherwise feeling well, without additional skin concerns.      Physical Exam:  Vitals: There were no vitals taken for this visit.  SKIN: Focused examination of hands and genital region was performed.  - There are two 1-2 mm verrucous papules with thrombosed capillaries and interruption of dermatoglyphics on the right thumb and right tip of the second finger  - patchy areas of hypopigmentation on the glands penis, slightly atrophic appearance    - No other lesions of concern on areas examined.     Medications:  Current Outpatient Medications   Medication     cetirizine (ZYRTEC) 10 MG tablet     albuterol (PROAIR HFA/PROVENTIL HFA/VENTOLIN HFA) 108 (90 Base) MCG/ACT inhaler     imiquimod (ALDARA) 5 % external cream     No current facility-administered medications for this visit.      Past Medical History:   Patient Active Problem List   Diagnosis     Environmental allergies     IMMUNIZATION HISTORY     Nutritional deficiency     Vision problem     Chronic allergic conjunctivitis     Episodic tension-type headache, not intractable     Allergic rhinitis due to pollen     Instability of right shoulder  joint     Instability of both shoulder joints     Preop general physical exam     Pain in joint involving ankle and foot, left     Past Medical History:   Diagnosis Date     Eczema      Seasonal allergies        CC Elsy Guerrero MD  [address] [address] on close of this encounter.       Again, thank you for allowing me to participate in the care of your patient.        Sincerely,        CE Roberson CNP

## 2023-12-15 NOTE — ASSESSMENT & PLAN NOTE
Behavioral Services                                              Medicare Re-Certification    I certify that the inpatient psychiatric hospital services furnished since the previous certification/re-certification were, and continue to be, medically necessary for;    [x] (1) Treatment which could reasonably be expected to improve the patient's condition,    [x] (2) Or for diagnostic study.    Estimated length of stay/service 2 - 3 weeks    Plan for post-hospital care per social work    This patient continues to need, on a daily basis, active treatment furnished directly by or requiring the supervision of inpatient psychiatric personnel.    Electronically signed by Rustam Rivero MD on 12/15/2023 at 2:42 PM    Approved for procedure. RCRI 0 of 6. METs likely 4 and above. No clotting disorders. No medication allergies. Has left nostril polyp present with left nasal deviation. COVID19 test on 7/23/22 was negative.     - CBC is normal with Hb at 14.1.   - BMP with Cr at 0.88. Sodium is mildly low at 131, not clear reason but likely not to impact prep for surgery.   - Advised no Ibuprofen, Aleve, Excedrin, Naproxen from now to surgery.   - Advised no alcohol from now to surgery.

## 2024-01-16 ENCOUNTER — OFFICE VISIT (OUTPATIENT)
Dept: PEDIATRICS | Facility: CLINIC | Age: 24
End: 2024-01-16
Payer: COMMERCIAL

## 2024-01-16 VITALS
WEIGHT: 180.6 LBS | HEART RATE: 105 BPM | TEMPERATURE: 98 F | HEIGHT: 68 IN | OXYGEN SATURATION: 97 % | BODY MASS INDEX: 27.37 KG/M2

## 2024-01-16 DIAGNOSIS — J06.9 VIRAL URI WITH COUGH: Primary | ICD-10-CM

## 2024-01-16 LAB
FLUAV AG SPEC QL IA: NEGATIVE
FLUBV AG SPEC QL IA: NEGATIVE

## 2024-01-16 PROCEDURE — 99213 OFFICE O/P EST LOW 20 MIN: CPT | Performed by: STUDENT IN AN ORGANIZED HEALTH CARE EDUCATION/TRAINING PROGRAM

## 2024-01-16 PROCEDURE — 87635 SARS-COV-2 COVID-19 AMP PRB: CPT | Performed by: STUDENT IN AN ORGANIZED HEALTH CARE EDUCATION/TRAINING PROGRAM

## 2024-01-16 PROCEDURE — 87804 INFLUENZA ASSAY W/OPTIC: CPT | Performed by: STUDENT IN AN ORGANIZED HEALTH CARE EDUCATION/TRAINING PROGRAM

## 2024-01-16 ASSESSMENT — ENCOUNTER SYMPTOMS
JOINT SWELLING: 0
SHORTNESS OF BREATH: 0
ABDOMINAL PAIN: 0
HEADACHES: 0
HEARTBURN: 0
NAUSEA: 0
EYE PAIN: 0
CONSTIPATION: 0
DIZZINESS: 0
WEAKNESS: 0
DIARRHEA: 0
NERVOUS/ANXIOUS: 0
HEMATOCHEZIA: 0
SORE THROAT: 0
DYSURIA: 0
MYALGIAS: 0
HEMATURIA: 0
CHILLS: 0
PARESTHESIAS: 0
COUGH: 1
ARTHRALGIAS: 0
FREQUENCY: 0
PALPITATIONS: 0
FEVER: 0

## 2024-01-16 NOTE — LETTER
January 16, 2024      Samson Morales  [address] [facility] [address]        To Whom It May Concern,     Davies M Andrew attended clinic here on Jan 16, 2024 and may return to school when his symptoms are better.     If you have questions or concerns, please call the clinic at the number listed above.    Sincerely,       Haroldo James

## 2024-01-16 NOTE — PROGRESS NOTES
"  Assessment & Plan     Viral URI with cough  Diagnoses and all orders for this visit:    Viral URI with cough  Samson presents with cough and congestion for 2 days. No fevers, difficulty breathing, N/V, emesis, rash or known sick contacts. Influenza antigen is negative, Covid PCR is pending. Reviewed symptomatic management.   -     Symptomatic COVID-19 Virus (Coronavirus) by PCR Nose  -     Influenza A & B Antigen - Clinic Collect        BMI:   Estimated body mass index is 27.21 kg/m  as calculated from the following:    Height as of this encounter: 5' 8.31\" (1.735 m).    Weight as of this encounter: 180 lb 9.6 oz (81.9 kg).     Haroldo James MD  [facility]        [facility]   Samson is a 23 year old, presenting for the following health issues:  No chief complaint on file.      1/16/2024     1:53 PM   Additional Questions   Roomed by beatris   Accompanied by him       Healthy Habits:     Getting at least 3 servings of Calcium per day:  Yes    Bi-annual eye exam:  Yes    Dental care twice a year:  NO    Sleep apnea or symptoms of sleep apnea:  None    Diet:  Regular (no restrictions)    Frequency of exercise:  6-7 days/week    Duration of exercise:  Greater than 60 minutes    Taking medications regularly:  Yes    Medication side effects:  Not applicable    Additional concerns today:  No           Review of Systems   Constitutional:  Negative for chills and fever.   HENT:  Positive for congestion. Negative for ear pain, hearing loss and sore throat.    Eyes:  Negative for pain and visual disturbance.   Respiratory:  Positive for cough. Negative for shortness of breath.    Cardiovascular:  Negative for chest pain, palpitations and peripheral edema.   Gastrointestinal:  Negative for abdominal pain, constipation, diarrhea, heartburn, hematochezia and nausea.   Genitourinary:  Negative for dysuria, frequency, genital sores, hematuria and urgency.   Musculoskeletal:  Negative for arthralgias, joint " "swelling and myalgias.   Skin:  Negative for rash.   Neurological:  Negative for dizziness, weakness, headaches and paresthesias.   Psychiatric/Behavioral:  Negative for mood changes. The patient is not nervous/anxious.       Constitutional, HEENT, cardiovascular, pulmonary, gi and gu systems are negative, except as otherwise noted.      Objective    Pulse 105   Temp 98  F (36.7  C) (Tympanic)   Ht 5' 8.31\" (1.735 m)   Wt 180 lb 9.6 oz (81.9 kg)   SpO2 97%   BMI 27.21 kg/m    Body mass index is 27.21 kg/m .  Physical Exam   GENERAL: healthy, alert and no distress  EYES: Eyes grossly normal to inspection, PERRL and conjunctivae and sclerae normal  HENT: ear canals and TM's normal, nose and mouth without ulcers or lesions  NECK: no adenopathy, no asymmetry, masses, or scars and thyroid normal to palpation  RESP: lungs clear to auscultation - no rales, rhonchi or wheezes  CV: regular rate and rhythm, normal S1 S2, no S3 or S4, no murmur, click or rub, no peripheral edema and peripheral pulses strong  ABDOMEN: soft, nontender, no hepatosplenomegaly, no masses and bowel sounds normal  MS: no gross musculoskeletal defects noted, no edema  SKIN: no suspicious lesions or rashes  NEURO: Normal strength and tone, mentation intact and speech normal  PSYCH: mentation appears normal, affect normal/bright      Results for orders placed or performed in visit on 01/16/24   Influenza A & B Antigen - Clinic Collect     Status: Normal    Specimen: Nasopharyngeal; Swab   Result Value Ref Range    Influenza A antigen Negative Negative    Influenza B antigen Negative Negative    Narrative    Test results must be correlated with clinical data. If necessary, results should be confirmed by a molecular assay or viral culture.             "

## 2024-01-16 NOTE — COMMUNITY RESOURCES LIST (ENGLISH)
01/16/2024   [facility]  N/A  For questions about this resource list or additional care needs, please contact your primary care clinic or care manager.  Phone: [phone]   Email: N/A   Address: [facility] [place] [address]   Hours: N/A        Hotlines and Helplines       Hotline - Housing crisis  1  [facility] Distance: 1.63 miles      Phone/Virtual   [address] [place] [address]  Language: English  Hours: Mon - Sun Open 24 Hours  Fees: Free   Phone: (494) 718-1648 Email: info@saintolaf.org Website: http://www.saintolaf.org/     2  [facility] Distance: 2.36 miles      Phone/Virtual   [address] [place] [address]  Language: English  Hours: Mon - Sun Open 24 Hours   Phone: (901) [phone] Email: communications@[facility].org Website: https://[facility].org/oursaviourshousing/          Housing       Coordinated Entry access point  3  [facility] - Adult penitentiary [facility] Distance: 1.63 miles      In-Person   [address] [place] [address]  Language: English  Hours: Mon - Sat 10:00 PM - 5:00 PM  Fees: Free   Phone: (142) 771-4651 Email: info@saintolaf.org Website: http://www.saintolaf.org/     4  [facility]ult [facility] Connect ([facility]) Distance: 1.94 miles      In-Person, Phone/Virtual   [address] [place] [address]  Language: English, Eritrean  Hours: Mon - Fri 10:00 AM - 5:30 PM , Mon - Sun 7:30 PM - 10:20 PM , Sat - Sun 1:00 PM - 5:30 PM  Fees: Free   Phone: (112) 313-6738 Email: info@myOrder Website: https://www.[employer].org/our-programs/adult-shelter-connect-[place]-shelter/     Drop-in center or day shelter  5  [facility] Distance: 1.71 miles      In-Person   [address] [place] [address]  Language: English, Hmong, Prydeinig, Eritrean  Hours: Mon - Thu 8:30 AM - 4:30 PM , Sat - Sun 9:00 AM - 12:00 PM  Fees: Free   Phone: (731) 517-9081 Email:  info@[employer].org Website: https://[employer].org/     6  [facility] [employer] [facility] - Youth Drop-in Center Distance: 1.93 miles      In-Person   [address] [place] [address]  Language: English, Turkish  Hours: Mon - Sun Open 24 Hours  Fees: Free   Phone: (842) 335-9525 Email: youthlink@[employer].org Website: http://www.[employer].org     Housing search assistance  7  [employer] - https://[employer]/ Distance: 1.32 miles      Phone/Virtual   [address] [place] [address]  Language: English  Hours: Mon - Sun Open 24 Hours   Email: info@[employer] Website: https://[employer]     8  [facility] - Human Services - Housing and Shelter - Homeless to Housing Distance: 1.39 miles      Phone/Virtual   [address] [facility] [address] [place] [address]  Language: English  Hours: Mon - Fri 9:00 AM - 4:00 PM  Fees: Free   Phone: (246) 817-3921 Website: https://www.[facility]/residents#human-services     Shelter for families  9  [facility] Distance: 16.93 miles      In-Person   [address] [place] [address]  Language: English  Hours: Mon - Fri 3:00 PM - 9:00 AM , Sat - Sun Open 24 Hours  Fees: Free   Phone: (229) 988-1874 Ext.1 Website: https://www.saintandrews.org/2020/07/03/emergency-family-shelter/     Shelter for individuals  10  [facility] Distance: 1.77 miles      In-Person   [address] [place] [address]  Language: English  Hours: Mon - Fri 4:00 PM - 9:00 AM  Fees: Free   Phone: (160) 761-7518 Email: melissa@[facility].org Website: https://[facility].org/northern/[facility]/     11  [facility] [place] [facility] assisted - [place] Distance: 1.98 miles      In-Person   [address] [place] [address]  Language: English  Hours: Mon - Sun 5:00 PM - 10:00  AM  Fees: Free, Self Pay   Phone: (160) 172-9750 Email: info@[facility]...[employer] Place Website: https://www.[facility]'s...[facility]/locations/higher-ground-shelter/     Shelter for youth  12  [facility] - Emergency Youth Shelter Distance: 4.44 miles      In-Person   [address] [place] [address]  Language: English  Hours: Mon - Sun Open 24 Hours  Fees: Free   Phone: (235) 627-5436 Email: edison@[facility].org Website: https://[facility].org/community/ecakk-gnywm-zufjf/     13  [facility] - Emergency Youth Shelter Distance: 4.75 miles      In-Person   [address] [place] [address]  Language: English  Hours: Mon - Sun Open 24 Hours  Fees: Free   Phone: (888) 792-1111 Email: info@[facility]...[facility] Place Website: https://www.[facility]...[facility]/locations/[place]-street/          Important Numbers & Websites       Emergency Services   [address]  [place] [address]  Poison Control   (568) 196-4422  Suicide Prevention Lifeline   (148) 614-1725 (TALK)  Child Abuse Hotline   (577) 495-4689 (4-A-Child)  Sexual Assault Hotline   (603) 939-2865 (HOPE)  National Runaway Safeline   (329) 640-8968 (RUNAWAY)  All-Options Talkline   (401) 281-4873  Substance Abuse Referral   ([phone] 558-3837 (HELP)

## 2024-01-16 NOTE — COMMUNITY RESOURCES LIST (PATIENT PREFERRED LANGUAGE)
01/16/2024   [facility]  N/A  Fernandomalcomtrudy mcintyre ama tigre abbott , darell wheatley  ama dahiana nieto.  Phone: [phone]   Email: N/A   Address: [address] [place] [address]   Hours: N/A        Cecilia Rivas - Dhibaatada guriyeynta  1  Kaniisadda Katooliga  Tekonsha Fogaanta: 1.63 miles      Telefoon/Virtual   [address] [place] [address]  Luuqad: Blancais  Jose Eliasdaha: Isniinta - Axad Furan 24 [facility]  Khmarcoshyada: Lynne reeves   Phone: ([phone]) [phone] Email: info@saintolaf.[employer] Website: http://www.saintolaf.org/     2  Leticia Barrios - Cecilia yang - Dhibaatada guriyaynta Fogaanta: 1.94 miles      Telefoon/Virtual   [address] [place] [address]  Luuqad: Daphneiriis  Jose Eliasdaha: Isniinta - Axad Furan 24 [facility]   Phone: (440) [phone] Email: communications@[facility].org Website: https://[facility]-[place].org/oursaviourshousing/          Tayriyemarata       Ryan suarez  isku-xidhan  3  Kaniisadda Katooliga  Tekonsha - Xidhiidhka Leticia Mabry - Degmada Zarina Fogaanta: 1.63 miles      Qof ahaan   [address] [place] [address]  Luuqad: Charbel Carmenha: Isbala - Sabti 10:00 GD - 5:00 GD  Khmarcoshyada: Sethaash   Phone: (101) [phone] Email: info@saintolaf.org Website: http://www.saintolaf.org/     4  Xidhiidhka Leticia Mabry (ASC) Fogaanta: 1.93 miles      Counts include 234 beds at the [facility] abiel, Telefoon/[address] [address]  Lindaqad: Trevin Bargercadaha: Isniinta - Jimce 10:00 GH - 5:30 GD , Isniinta - Axad 7:30 GD - 10:20 GD , Sabti - Axad 1:00 GD - 5:30 GD  Matilde: Jo Ann   Phone: [phone] Email: info@[facility].org Website: https://www.[facility].org/our-programs/adult-shelter-connect-stevens-shelter/     Naa cid  5  Wadaagista iyo Gacmaha  Daryeelida Fogaanta: 1.71 miles      Qof ahaan   [address] [place] [address]  Luuqad: Daphne YuTrevin paulson, Coco  Saacadaha: Isniinta - Khamiista 8:30 GH - 4:30 GD , Sabti - Axad 9:00 GH - 12:00 GD  Kharashyada: Bilaash   Phone: (754) [phone] Email: info@[employer].org Website: https://sharingCorban Directs.org/     6  [facility] - Hoyga Xarunta Fursadaha Dhallinyarada - Xarunta Dhalin-yarada Fogaanta: 2.36 miles      Qof ahaan   [address] [place] [address]  Luuqad: DaphneruyAnaid sahuzena  Saacadaha: Isniinta - Axad [address]  Kharashyada: Bilaash   Phone: [phone]) [phone] Email: youthlink@youthlinkmn.org Website: http://www.youthlinkmn.org     Lizzy hdez  7  Tayryaha Adore olmedo  Online - https://[employer]/ Fogaanta: 1.32 miles      Telefoon/Virtual   [address] [place] [address]  Luuqad: Charbel  Saacadaha: Isniinta - Axad Furan 24 [facility]   Email: info@[employer] Website: https://JoKno.[employer]Fly     8  Degmada Zarina - Jayda Aadanaha - Tayriyemarata iyo Clarenceyga - Joshua la'aan Fogaanta: 1.39 miles      Telefoon/Virtual   [address] [facility] [address] [place] [address]  Luuqad: Charbel Doradocadaha: Isniinta - Jimce 9:00 GH - 4:00 GD  Kharashyada: Bilaash   Phone: [phone] Website: https://www.[employer]/residents#human-services     Hoyga qoysaska  9  Hoyga Qoyska [facility] Fogaanta: 1.98 miles      [facility]   [address] [place] [place] [address]  Luuqad: Charbel Bañuelos: Jose Chapin 3:00 GD - 9:00 Leo FIELDn 24 [facility]  Matilde: Jo Ann   Phone: [phone] Ext.1 Website: https://www.saintInHomeVests.org/2020/07/03/emergency-family-shelter/     Leticia johnson  10  [facility] Iftiinka Dekedda Fogaanta: 1.77 miles      [facility]   [address] [place] [address]  Luuqad: Charbel Bañuelos: Jose Chapin 4:00 GD - 9:00   Matilde:  Bilaash   Phone: [phone] Email: melissa@[facility].org Website: https://central[facility]iatric [facility].org/[facility]/     11  [facility] - Hoygkathryn Alcantara [facility] Fogaanta: 4.44 miles      Qof ahaan   [address] [place] [address]  Luuqad: Ingiriis  Saacadaha: Isniinta - Axad 5:00 GD - 10:00 GH  Kharashyada: Bilaash, Is Bixinta   Phone: [phone] Email: info@[employer] Website: https://www.Wik[employer]on/locations/higher-ground-shelter/     Hoyga keeleylinyarada  12  [facility] Keeleyllinyarada [facility] Fogaanta: 4.75 miles      Qof ahaan   [address] [place] [address]  Luuqad: Ingiriis  Saacadaha: Isniinta - Axad Furan 24 [facility]  Kharashyada: Bilaash   Phone: [phone] Email: edison@[facility].org Website: https://[facility].org/[facility]/     13  Alex H[facility] - ygkathryn Menesesllinyarada  Gurmadka  Fogaanta: 16.93 miles      Qof ahaan   [address] [place] [address]  Luuqad: Ingiriis  Saacadaha: Isniinta - Axad Furan 24 [facility]  Kharashyada: Bilaash   Phone: [phone] Email: info@[employer] Website: https://www.[employer].org/locations/hope-street/          Billy Cruz  & Pamela       Adeegyada Tayrmadka   911  Adeegyada Magaalada   311  Kontoroolka Sunta   [phone]  Lifeline  ejchristianakathryn samara   [phone] (TALK)  Cecilia Tobias   [phone] (4-A-Child)  Cecilia Siegel   [phone] (HOPE)  National Runaway Safeline   [phone] (RUNAWAY)  Cecilia Campo   [phone]  Rahul Mckeon   [phone] (HELP)

## 2024-01-17 LAB — SARS-COV-2 RNA RESP QL NAA+PROBE: NEGATIVE

## 2024-02-18 ENCOUNTER — HEALTH MAINTENANCE LETTER (OUTPATIENT)
Age: 24
End: 2024-02-18

## 2024-06-18 ENCOUNTER — OFFICE VISIT (OUTPATIENT)
Dept: INTERNAL MEDICINE | Facility: CLINIC | Age: 24
End: 2024-06-18
Payer: COMMERCIAL

## 2024-06-18 VITALS
DIASTOLIC BLOOD PRESSURE: 79 MMHG | SYSTOLIC BLOOD PRESSURE: 149 MMHG | HEART RATE: 74 BPM | OXYGEN SATURATION: 96 % | WEIGHT: 166.9 LBS | BODY MASS INDEX: 25.15 KG/M2

## 2024-06-18 DIAGNOSIS — M25.532 LEFT WRIST PAIN: Primary | ICD-10-CM

## 2024-06-18 PROCEDURE — 99214 OFFICE O/P EST MOD 30 MIN: CPT | Performed by: NURSE PRACTITIONER

## 2024-06-18 RX ORDER — ACETAMINOPHEN 80 MG/1
80 TABLET, CHEWABLE ORAL EVERY 4 HOURS PRN
COMMUNITY

## 2024-06-18 NOTE — PROGRESS NOTES
Assessment and Plan   Mr. Morales is a 23 year old male seen for evaluation of wrist pain.      (M25.532) Left wrist pain  (primary encounter diagnosis)  Comment: Most likely overuse strain  Plan:  Rest, ice, wrist splint at night.  May take ibuprofen 400 mg with food up to TID for 3-4 days         Most Recent Immunizations   Administered Date(s) Administered    COVID-19 MONOVALENT 12+ (Pfizer) 08/04/2021    DTAP (<7y) 08/04/2014    HEPA 11/25/2015    HIB (PRP-T) 09/22/2005    HPV 05/27/2016    HepB 11/21/2005    Influenza Vaccine >6 months,quad, PF 11/09/2021    MMR 09/05/2007    Meningococcal (Menomune ) 08/04/2014    Meningococcal ACWY (Menactra ) 10/04/2019    Meningococcal B (Bexsero ) 02/07/2020    Nasal Influenza Vaccine 2-49 (FluMist) 11/25/2015    Pneumococcal (PCV 7) 09/22/2005    Poliovirus, inactivated (IPV) 09/05/2007    Varicella 09/05/2007       Return to clinic/Follow-up:  As needed and with no improvement, worsening, or new symptom development.     Options for treatment and follow-up care were reviewed with the patient. He engaged in the decision making process and verbalized understanding of the options discussed and agreed with the final plan.    I spent a total of 30 minutes in the care of this pt today, including time prior to, during, and following today's office visit. This time includes reviewing the patient's chart and prior history, external notes, obtaining a history, performing an examination, interpreting test results, and evaluation and counseling the patient. This time also includes ordering medications or tests necessary in addition to communication to other member's of the patient's health care team. Time spent in documentation and care coordination is included.    Arabella Reyes, ANP, AGACNP, APRN, CNP  Nurse Practitioner      __________________________    Subjective   Presenting Concern:    Mr. Morales is a 23 year old male who presents for evaluation of wrist pain.      History of  Present Illness:    L Wrist pain:  Wrist pain X 2 days.  Lifts weights 6 days/week. Feels like he may have lifted too much over the weekend. The pain is described as a pulsing sensation, rated 5-6/10.  Denies numbness, tingling, weakness.   Took a dose of tylenol, which helped.  He has been icing and wearing a wrist splint.    Daytime work as . Periodic keyboarding.    Reports that his joints are very flexible.  He's had a couple shoulder dislocations in the past.      Past Medical History:  Past Medical History:   Diagnosis Date    Eczema     Seasonal allergies        Active Meds:  Current Outpatient Medications   Medication Sig Dispense Refill    albuterol (PROAIR HFA/PROVENTIL HFA/VENTOLIN HFA) 108 (90 Base) MCG/ACT inhaler Inhale 2 puffs into the lungs every 4 hours as needed for shortness of breath, wheezing or cough (Patient not taking: Reported on 7/26/2023) 18 g 1    cetirizine (ZYRTEC) 10 MG tablet Take 10 mg by mouth daily (Patient not taking: Reported on 1/16/2024)      imiquimod (ALDARA) 5 % external cream Apply a small sized amount to warts or molluscum three times weekly at bedtime.   Wash off after 8 hours.   May use for up to 16 weeks. (Patient not taking: Reported on 7/26/2023) 12 packet 3     No current facility-administered medications for this visit.        Allergies:  Reviewed, refer to EMR    Review of Systems - negative except as document in HPI        Objective      BP (!) 149/79 (BP Location: Right arm, Patient Position: Sitting, Cuff Size: Adult Regular)   Pulse 74   Wt 75.7 kg (166 lb 14.4 oz)   SpO2 96%   BMI 25.15 kg/m        Physical Exam   General appearance: alert, well appearing, and in no distress  Eyes: extra-ocular movements intact, pupils equally round and reactive bilaterally, no scleral icterus  Neck: no significant adenopathy, lymphadenopathy  Respiratory: Good air movement bilaterally, lungs clear, no wheezes/rales/rhonchi, symmetric air entry and  excursion  Cardiovascular: normal rate and regular rhythm, S1 and S2 normal, no murmurs, rubs or gallops, peripheral pulses full/symmetric, no peripheral edema  Musculoskeletal: Wrist without deformity, swelling, edema. No tenderness to palpation or manipulation in the wrist, metacarpals, or phalanges. Full active ROM to resistance. Normal muscle tone and strength.   Neurological: cranial nerves II through XII grossly intact, motor and sensory grossly normal bilaterally, normal muscle tone, no tremors, strength 5/5 throughout, sensation to light touch intact  Extremities: peripheral pulses normal  Skin: normal coloration and turgor.   Psychiatric: normal mentation, affect and mood    Answers submitted by the patient for this visit:  General Questionnaire (Submitted on 6/18/2024)  Chief Complaint: Chronic problems general questions HPI Form  How many servings of fruits and vegetables do you eat daily?: 2-3  On average, how many sweetened beverages do you drink each day (Examples: soda, juice, sweet tea, etc.  Do NOT count diet or artificially sweetened beverages)?: 1  How many minutes a day do you exercise enough to make your heart beat faster?: 60 or more  How many days a week do you exercise enough to make your heart beat faster?: 7  How many days per week do you miss taking your medication?: 0  General Concern (Submitted on 6/18/2024)  Chief Complaint: Chronic problems general questions HPI Form  What is the reason for your visit today?: LEFT WRIST PAIN  When did your symptoms begin?: 1-3 days ago  How would you describe these symptoms?: Mild  Are your symptoms:: Improving  Have you had these symptoms before?: No  Is there anything that makes you feel worse?: WORSE IN THE MORNING.  Is there anything that makes you feel better?: WEARING BRACE.

## 2024-06-28 ENCOUNTER — OFFICE VISIT (OUTPATIENT)
Dept: INTERNAL MEDICINE | Facility: CLINIC | Age: 24
End: 2024-06-28
Payer: COMMERCIAL

## 2024-06-28 VITALS
HEART RATE: 98 BPM | HEIGHT: 68 IN | DIASTOLIC BLOOD PRESSURE: 84 MMHG | WEIGHT: 170.4 LBS | OXYGEN SATURATION: 100 % | SYSTOLIC BLOOD PRESSURE: 144 MMHG | BODY MASS INDEX: 25.82 KG/M2

## 2024-06-28 DIAGNOSIS — M25.532 LEFT WRIST PAIN: Primary | ICD-10-CM

## 2024-06-28 PROCEDURE — 99213 OFFICE O/P EST LOW 20 MIN: CPT

## 2024-06-28 NOTE — PROGRESS NOTES
"  Assessment & Plan     Left wrist pain  Left wrist pain, worsening over the past week despite activity modification. He does find stretching helpful. Recommend continued activity modification, stretching. Will refer to PT. As pain has worsened, will also refer to sports medicine\.   - Orthopedic  Referral  - Occupational Therapy  Referral    No follow-ups on file.    Subjective   Samson is a 23 year old, presenting for the following health issues:  Follow Up (Increased left wrist pain, can't sleep, take pain medication at night, wearing brace as much as can even in sleep but brace hurts too)        6/28/2024     3:01 PM   Additional Questions   Roomed by [facility] EMT     HPI     Samson Morales presents to the clinic today for follow-up on left wrist pain. He was evaluated in clinic on 6/18 for the pain. No known injury, felt like symptoms started after lifting. Was advised to rest, use ice, ibuprofen, splint use at night.     He reports he has been trying to rest, wearing the splint. Feels like left radial wrist pain is worsening. He notes the brace is uncomfortable, seems to worsen pain. Stretching can help. He has tried tylenol 500 mg which offers minimal benefit, does not help long. Ice and application of black seed oil help a bit.  He does note some tingling/numbness that can occur with certain movements.     BP a bit elevated today. He is working on decreasing caffeine.       Review of Systems  Constitutional, HEENT, cardiovascular, pulmonary, gi and gu systems are negative, except as otherwise noted.      Objective    BP (!) 143/81 (BP Location: Right arm, Patient Position: Sitting, Cuff Size: Adult Regular)   Pulse 98   Ht 1.735 m (5' 8.31\")   Wt 77.3 kg (170 lb 6.4 oz)   SpO2 100%   BMI 25.68 kg/m    Body mass index is 25.68 kg/m .  Physical Exam   GENERAL: alert and no distress  MS: left wrist with ROM intact, no erythema or swelling, no point tenderness. Strength intact. Circulation " intact. Sensation grossly intact. Finkelstein negative.   PSYCH: mentation appears normal, affect normal/bright            Signed Electronically by: Negar Lai NP

## 2024-06-28 NOTE — PATIENT INSTRUCTIONS
Can use tylenol 1000 mg every 8 hours as needed  Can also try ibuprofen 400 mg every 8 hours as needed (can take with food)  Continue to use heat/ice  Continue to rest

## 2024-08-01 ENCOUNTER — LAB (OUTPATIENT)
Dept: LAB | Facility: CLINIC | Age: 24
End: 2024-08-01
Payer: COMMERCIAL

## 2024-08-01 ENCOUNTER — OFFICE VISIT (OUTPATIENT)
Dept: INTERNAL MEDICINE | Facility: CLINIC | Age: 24
End: 2024-08-01
Payer: COMMERCIAL

## 2024-08-01 VITALS
OXYGEN SATURATION: 98 % | DIASTOLIC BLOOD PRESSURE: 82 MMHG | SYSTOLIC BLOOD PRESSURE: 131 MMHG | TEMPERATURE: 98.9 F | HEART RATE: 76 BPM | WEIGHT: 167.2 LBS | BODY MASS INDEX: 25.19 KG/M2

## 2024-08-01 DIAGNOSIS — J02.9 SORE THROAT: ICD-10-CM

## 2024-08-01 DIAGNOSIS — J02.9 SORE THROAT: Primary | ICD-10-CM

## 2024-08-01 LAB
DEPRECATED S PYO AG THROAT QL EIA: NEGATIVE
FLUAV RNA SPEC QL NAA+PROBE: NEGATIVE
FLUBV RNA RESP QL NAA+PROBE: NEGATIVE
GROUP A STREP BY PCR: NOT DETECTED
MONOCYTES NFR BLD AUTO: NEGATIVE %
RSV RNA SPEC NAA+PROBE: NEGATIVE
SARS-COV-2 RNA RESP QL NAA+PROBE: NEGATIVE

## 2024-08-01 PROCEDURE — 99000 SPECIMEN HANDLING OFFICE-LAB: CPT | Performed by: PATHOLOGY

## 2024-08-01 PROCEDURE — 87651 STREP A DNA AMP PROBE: CPT

## 2024-08-01 PROCEDURE — 87637 SARSCOV2&INF A&B&RSV AMP PRB: CPT

## 2024-08-01 PROCEDURE — 99213 OFFICE O/P EST LOW 20 MIN: CPT | Mod: GC

## 2024-08-01 PROCEDURE — 36415 COLL VENOUS BLD VENIPUNCTURE: CPT | Performed by: PATHOLOGY

## 2024-08-01 PROCEDURE — 86308 HETEROPHILE ANTIBODY SCREEN: CPT

## 2024-08-01 ASSESSMENT — ENCOUNTER SYMPTOMS
NAUSEA: 1
SORE THROAT: 1
DIZZINESS: 1
FATIGUE: 1

## 2024-08-01 NOTE — PROGRESS NOTES
"  Assessment & Plan     Pharyngitis   Pt reports 3 days of sore throat, night chills, lack of appetite, and fatigue. Denies any sick contacts, no productive cough. I suspect this is most likely viral in nature. Plan for supportive treatment for now, pending below workup.  - Mononucleosis screen; Future  - Streptococcus A Rapid Screen w/Reflex to PCR - Clinic Collect  - Symptomatic Influenza A/B, RSV, & SARS-CoV2 PCR (COVID-19) Nose  - Group A Streptococcus PCR Throat Swab      BMI  Estimated body mass index is 25.19 kg/m  as calculated from the following:    Height as of 6/28/24: 1.735 m (5' 8.31\").    Weight as of this encounter: 75.8 kg (167 lb 3.2 oz).       No follow-ups on file.    Lory Davies is a 23 year old, presenting for the following health issues:  Dizziness (Pt developed dizziness, sore throat, nausea on Monday night), Pharyngitis (Pt reports sore throat, lymph nodes on R side are swollen), Nausea (Pt reports upset stomach, has been difficult for pt to eat), and Fatigue (Pt reports generalized weakness/tiredness)      8/1/2024     9:59 AM   Additional Questions   Roomed by Marissa MANSFIELD     Dizziness  Associated symptoms include fatigue, nausea and a sore throat.   Pharyngitis     Nausea  Associated symptoms include fatigue, nausea and a sore throat.   Fatigue  Associated symptoms include fatigue, nausea and a sore throat.   History of Present Illness       Reason for visit:  Sickness  Symptom onset:  1-3 days ago  Symptom intensity:  Severe  Symptom progression:  Staying the same  Had these symptoms before:  No  What makes it worse:  Sore throat  What makes it better:  Nightqwell    He eats 2-3 servings of fruits and vegetables daily.He consumes 2 sweetened beverage(s) daily.He exercises with enough effort to increase his heart rate 60 or more minutes per day.  He exercises with enough effort to increase his heart rate 7 days per week. He is missing 6 dose(s) of medications per week.     Patient " complains of sore throat that started this Monday evening.  He is reporting some mild chills, headache and dizziness.  He does not have a great appetite, also complaining of some abdominal pain.  He complains of difficulty swallowing.  In addition patient is also reporting a headache diffused accompanied by lightheadedness the last minute or time.  Also reporting a mild runny nose and some nausea but no vomiting.  He denies any sick contacts, denies any recent traveling.  Has no pets at home.    He denies any diarrhea, initially has been constipated no bowel movement for the past 3 days.  He has been taking NyQuil which helps a little bit alleviating symptoms.    Patient denies any tobacco use, alcohol and/or recreational drug use.  He is not sexually active.  Received prior COVID vaccines x 2, no recent booster per chart review.    Answers submitted by the patient for this visit:  General Questionnaire (Submitted on 8/1/2024)  Chief Complaint: Chronic problems general questions HPI Form  How many servings of fruits and vegetables do you eat daily?: 2-3  On average, how many sweetened beverages do you drink each day (Examples: soda, juice, sweet tea, etc.  Do NOT count diet or artificially sweetened beverages)?: 2  How many minutes a day do you exercise enough to make your heart beat faster?: 60 or more  How many days a week do you exercise enough to make your heart beat faster?: 7  How many days per week do you miss taking your medication?: 6  General Concern (Submitted on 8/1/2024)  Chief Complaint: Chronic problems general questions HPI Form  What is the reason for your visit today?: sickness  When did your symptoms begin?: 1-3 days ago  How would you describe these symptoms?: Severe  Are your symptoms:: Staying the same  Have you had these symptoms before?: No  Is there anything that makes you feel worse?: sore throat  Is there anything that makes you feel better?: nightqwell        Objective    /82 (BP  Location: Right arm, Patient Position: Sitting, Cuff Size: Adult Regular)   Pulse 76   Temp 98.9  F (37.2  C) (Oral)   Wt 75.8 kg (167 lb 3.2 oz)   SpO2 98%   BMI 25.19 kg/m    Body mass index is 25.19 kg/m .  Physical Exam   Gen: NAD, alert, cooperative, non-toxic  HEENT: EOMI, no conjunctival icterus, tracking appropriately, no cervical and/or supraclavicular lymphadenopathy noted  Resp: CTAB, no crackles or wheezes, no increased WOB  Cardiac: RRR, no S3/S4, no M/R/G appreciated  GI: soft, non-tender, non-distended       Pt was seen and plan discussed with Dr. Medina    Signed Electronically by: Peter Field DO, PGY3  Internal Medicine

## 2025-01-09 ENCOUNTER — OFFICE VISIT (OUTPATIENT)
Dept: FAMILY MEDICINE | Facility: CLINIC | Age: 25
End: 2025-01-09
Payer: COMMERCIAL

## 2025-01-09 ENCOUNTER — TELEPHONE (OUTPATIENT)
Dept: INTERNAL MEDICINE | Facility: CLINIC | Age: 25
End: 2025-01-09

## 2025-01-09 VITALS
TEMPERATURE: 97.2 F | DIASTOLIC BLOOD PRESSURE: 65 MMHG | HEIGHT: 69 IN | HEART RATE: 61 BPM | WEIGHT: 168 LBS | SYSTOLIC BLOOD PRESSURE: 144 MMHG | BODY MASS INDEX: 24.88 KG/M2 | RESPIRATION RATE: 16 BRPM | OXYGEN SATURATION: 98 %

## 2025-01-09 DIAGNOSIS — B00.1 COLD SORE: ICD-10-CM

## 2025-01-09 DIAGNOSIS — K13.0 SORE OF LIP: ICD-10-CM

## 2025-01-09 DIAGNOSIS — Z11.3 SCREENING EXAMINATION FOR VENEREAL DISEASE: Primary | ICD-10-CM

## 2025-01-09 RX ORDER — VALACYCLOVIR HYDROCHLORIDE 1 G/1
1000 TABLET, FILM COATED ORAL 2 TIMES DAILY
Qty: 20 TABLET | Refills: 0 | Status: SHIPPED | OUTPATIENT
Start: 2025-01-09 | End: 2025-01-19

## 2025-01-09 ASSESSMENT — PAIN SCALES - GENERAL: PAINLEVEL_OUTOF10: NO PAIN (0)

## 2025-01-09 NOTE — PATIENT INSTRUCTIONS
Safer Sex: Care Instructions  Overview  Safer sex is a way to reduce your risk of getting a sexually transmitted infection (STI). It can also help prevent pregnancy.  Several products can help you practice safer sex and reduce your chance of STIs. One of the best is a condom. There are internal and external condoms. You can use a special rubber sheet (dental dam) for protection during oral sex. Disposable gloves can keep your hands from touching blood, semen, or other body fluids that can carry infections.  Remember that birth control methods such as diaphragms, IUDs, foams, and birth control pills do not stop you from getting STIs.  Follow-up care is a key part of your treatment and safety. Be sure to make and go to all appointments, and call your doctor if you are having problems. It's also a good idea to know your test results and keep a list of the medicines you take.  How can you care for yourself at home?  Think about getting vaccinated to help prevent hepatitis A, hepatitis B, and human papillomavirus (HPV). They can be spread through sex.  Use a condom every time you have sex. Use an external condom, which goes on the penis. Or use an internal condom, which goes into the vagina or anus.  Make sure you use the right size external condom. A condom that's too small can break easily. A condom that's too big can slip off during sex.  Use a new condom each time you have sex. Be careful not to poke a hole in the condom when you open the wrapper.  Don't use an internal condom and an external condom at the same time.  Never use petroleum jelly (such as Vaseline), grease, hand lotion, baby oil, or anything with oil in it. These products can make holes in the condom.  After intercourse, hold the edge of the condom as you remove it. This will help keep semen from spilling out of the condom.  Do not have sex with anyone who has symptoms of an STI, such as sores on the genitals or mouth.  Do not drink a lot of alcohol or  "use drugs before sex.  Limit your sex partners. Sex with one partner who has sex only with you can reduce your risk of getting an STI.  Don't share sex toys. But if you do share them, use a condom and clean the sex toys between each use.  Talk to any partners before you have sex. Talk about what you feel comfortable with and whether you have any boundaries with sex. And find out if your partner or partners may be at risk for any STI. Keep in mind that a person may be able to spread an STI even if they do not have symptoms. You and any partners may want to get tested for STIs.  Where can you learn more?  Go to https://www.[employer].net/patiented  Enter B608 in the search box to learn more about \"Safer Sex: Care Instructions.\"  Current as of: April 30, 2024  Content Version: 14.3    2024 [employer].   Care instructions adapted under license by your healthcare professional. If you have questions about a medical condition or this instruction, always ask your healthcare professional. [employer] disclaims any warranty or liability for your use of this information.    "

## 2025-01-09 NOTE — PROGRESS NOTES
"  Assessment & Plan     Screening examination for venereal disease  No specific known contact or symptoms.  Female partners, desires asymptomatic screening.   - Chlamydia trachomatis/Neisseria gonorrhoeae by PCR (first-voided urine)  - HIV Antigen Antibody Combo Cascade; Future  - Treponema Abs w Reflex to RPR and Titer; Future  - HIV Antigen Antibody Combo Cascade  - Treponema Abs w Reflex to RPR and Titer    Sore of lip  Developed this morning.  Thought perhaps bit lip.      Exam today reveals three very small vesicles on base of slightly raised , erythematous mucosa.  Able to break open vesicles with swab to perform.     Discussed management of cold sores if they become recurrent and discussed how they are separate from genital herpes.  Discussed avoiding mucous membrane contact with others if has open sore.  Education material provided in after visit summary as well.     Can send me a picture tomorrow to follow evolution of lesions to help determine if truly a cold sore.   - Herpes Simplex Virus 1&2 by PCR; Future  - Herpes Simplex Virus 1&2 by PCR    Cold sore  As above.  If become shallow ulcer tomorrow then this would confirm cold sore and I would recommend starting therapy.  - valACYclovir (VALTREX) 1000 mg tablet; Take 1 tablet (1,000 mg) by mouth 2 times daily for 10 days.      35 minutes spent on the date of the encounter doing chart review, history and exam, negotiating and explaining the plan with the patient, documentation and further activities as noted above.      The longitudinal plan of care for the diagnosis(es)/condition(s) as documented were addressed during this visit. Due to the added complexity in care, I will continue to support Samson in the subsequent management and with ongoing continuity of care.      BMI  Estimated body mass index is 25.03 kg/m  as calculated from the following:    Height as of this encounter: 1.745 m (5' 8.7\").    Weight as of this encounter: 76.2 kg (168 lb). "             Lory Davies is a 24 year old, presenting for the following health issues:  STD      1/9/2025     3:05 PM   Additional Questions   Roomed by Venkata SILVA     STD    History of Present Illness       Reason for visit:  Std test  Symptom onset:  Today   He is taking medications regularly.                     Objective    There were no vitals taken for this visit.  There is no height or weight on file to calculate BMI.  Physical Exam   As above.             Signed Electronically by: Joel Daniel Wegener, MD

## 2025-01-09 NOTE — TELEPHONE ENCOUNTER
Patient has appointment in [place], should keep appointment.    Bradly Lozoya RN on 1/9/2025 at 2:18 PM

## 2025-01-09 NOTE — TELEPHONE ENCOUNTER
[facility] Call Center    Phone Message    May a detailed message be left on voicemail: yes     Reason for Call: Order(s): Other:   Reason for requested: STD test   Date needed: asap  Provider name: Skinny    Action Taken: Message routed to:  [facility] pcc    Travel Screening: Not Applicable     Date of Service:

## 2025-01-11 ENCOUNTER — TELEPHONE (OUTPATIENT)
Dept: FAMILY MEDICINE | Facility: CLINIC | Age: 25
End: 2025-01-11
Payer: COMMERCIAL

## 2025-01-11 NOTE — TELEPHONE ENCOUNTER
FYI - Status Update    Who is Calling: patient    Update: Pt got his test results and would like an rx sent to the pharmacy. To the [address] [place] [address] location.     Does caller want a call/response back: Yes     Could we send this information to you in [employer] or would you prefer to receive a phone call?:   Patient would prefer a phone call   Okay to leave a detailed message?: Yes at Home number on file

## 2025-01-13 NOTE — TELEPHONE ENCOUNTER
JW,  Please see below message and advise.  Nurse unsure if/what preferred antiviral is    Thanks,  Rosa Elena THOMAS RN

## 2025-03-05 ENCOUNTER — LAB (OUTPATIENT)
Dept: LAB | Facility: CLINIC | Age: 25
End: 2025-03-05
Payer: COMMERCIAL

## 2025-03-05 ENCOUNTER — OFFICE VISIT (OUTPATIENT)
Dept: INTERNAL MEDICINE | Facility: CLINIC | Age: 25
End: 2025-03-05
Payer: COMMERCIAL

## 2025-03-05 VITALS
HEIGHT: 69 IN | BODY MASS INDEX: 27.15 KG/M2 | TEMPERATURE: 97.8 F | DIASTOLIC BLOOD PRESSURE: 87 MMHG | OXYGEN SATURATION: 100 % | RESPIRATION RATE: 16 BRPM | WEIGHT: 183.3 LBS | HEART RATE: 68 BPM | SYSTOLIC BLOOD PRESSURE: 132 MMHG

## 2025-03-05 DIAGNOSIS — Z11.3 SCREEN FOR SEXUALLY TRANSMITTED DISEASES: Primary | ICD-10-CM

## 2025-03-05 DIAGNOSIS — Z23 NEED FOR VACCINATION: ICD-10-CM

## 2025-03-05 DIAGNOSIS — Z11.3 SCREEN FOR SEXUALLY TRANSMITTED DISEASES: ICD-10-CM

## 2025-03-05 LAB
C TRACH DNA SPEC QL NAA+PROBE: NEGATIVE
HCV AB SERPL QL IA: NONREACTIVE
HIV 1+2 AB+HIV1 P24 AG SERPL QL IA: NONREACTIVE
N GONORRHOEA DNA SPEC QL NAA+PROBE: NEGATIVE
SPECIMEN TYPE: NORMAL
SPECIMEN TYPE: NORMAL
T PALLIDUM AB SER QL: NONREACTIVE

## 2025-03-05 PROCEDURE — 90472 IMMUNIZATION ADMIN EACH ADD: CPT | Performed by: INTERNAL MEDICINE

## 2025-03-05 PROCEDURE — 3079F DIAST BP 80-89 MM HG: CPT | Performed by: INTERNAL MEDICINE

## 2025-03-05 PROCEDURE — 3075F SYST BP GE 130 - 139MM HG: CPT | Performed by: INTERNAL MEDICINE

## 2025-03-05 PROCEDURE — 99213 OFFICE O/P EST LOW 20 MIN: CPT | Mod: 25 | Performed by: INTERNAL MEDICINE

## 2025-03-05 PROCEDURE — 86780 TREPONEMA PALLIDUM: CPT | Performed by: INTERNAL MEDICINE

## 2025-03-05 PROCEDURE — 99000 SPECIMEN HANDLING OFFICE-LAB: CPT | Performed by: PATHOLOGY

## 2025-03-05 PROCEDURE — 90715 TDAP VACCINE 7 YRS/> IM: CPT | Performed by: INTERNAL MEDICINE

## 2025-03-05 PROCEDURE — 90656 IIV3 VACC NO PRSV 0.5 ML IM: CPT | Performed by: INTERNAL MEDICINE

## 2025-03-05 PROCEDURE — 87389 HIV-1 AG W/HIV-1&-2 AB AG IA: CPT | Performed by: INTERNAL MEDICINE

## 2025-03-05 PROCEDURE — 87591 N.GONORRHOEAE DNA AMP PROB: CPT | Performed by: INTERNAL MEDICINE

## 2025-03-05 PROCEDURE — 36415 COLL VENOUS BLD VENIPUNCTURE: CPT | Performed by: PATHOLOGY

## 2025-03-05 PROCEDURE — 90471 IMMUNIZATION ADMIN: CPT | Performed by: INTERNAL MEDICINE

## 2025-03-05 PROCEDURE — 87491 CHLMYD TRACH DNA AMP PROBE: CPT | Performed by: INTERNAL MEDICINE

## 2025-03-05 PROCEDURE — 86803 HEPATITIS C AB TEST: CPT | Performed by: INTERNAL MEDICINE

## 2025-03-05 NOTE — PROGRESS NOTES
"  Assessment & Plan     Screen for sexually transmitted diseases  Patient denies any specific concerns or symptoms in relation to his request for testing for sexually transmitted infections.  Orders were placed today.  Discussed safe sex practices.  - HIV Antigen Antibody Combo; Future  - Hepatitis C Screen Reflex to HCV RNA Quant and Genotype; Future  - Treponema Abs w Reflex to RPR and Titer; Future  - NEISSERIA GONORRHOEA PCR; Future  - CHLAMYDIA TRACHOMATIS PCR; Future    Need for vaccination  Reviewed vaccinations, recommend Tdap as well as influenza vaccine.  Reviewed COVID-vaccine as well.  Patient would prefer to postpone it at this time.  - TDAP VACCINE (Adacel, Boostrix)  [[id number]]  - INFLUENZA VACCINE, SPLIT VIRUS, TRIVALENT,PF (FLUZONE\FLUARIX)    I spent a total of 20 minutes on the day of the visit.   Time spent by me today doing chart review, history and exam, documentation and further activities per the note        BMI  Estimated body mass index is 27.3 kg/m  as calculated from the following:    Height as of this encounter: 1.745 m (5' 8.7\").    Weight as of this encounter: 83.1 kg (183 lb 4.8 oz).           No follow-ups on file.    Subjective   Samson is a 24 year old, presenting for the following health issues:  Follow Up (Pt here for STD testing)      3/5/2025     9:38 AM   Additional Questions   Roomed by Kaila TORRES   Accompanied by N/A     History of Present Illness       Reason for visit:  STD testing   He is taking medications regularly.      Patient reports that she he is interested in testing for sexually transmitted infections.  He denies any specific concerns.  He has female partner.  He is using condoms regularly.      Objective    /87 (BP Location: Right arm, Patient Position: Sitting, Cuff Size: Adult Regular)   Pulse 68   Temp 97.8  F (36.6  C)   Resp 16   Ht 1.745 m (5' 8.7\")   Wt 83.1 kg (183 lb 4.8 oz)   SpO2 100%   BMI 27.30 kg/m    Body mass index is 27.3 " kg/m .  Physical Exam   GENERAL: alert and no distress  EYES: Eyes grossly normal to inspection, PERRL and conjunctivae and sclerae normal  RESP: normal effort, no wheezing  MS: no gross musculoskeletal defects noted, no edema  SKIN: no suspicious lesions or rashes  NEURO: Normal strength and tone, mentation intact and speech normal            Signed Electronically by: Delisa Fontaine MD

## 2025-03-09 ENCOUNTER — HEALTH MAINTENANCE LETTER (OUTPATIENT)
Age: 25
End: 2025-03-09

## 2025-04-29 ENCOUNTER — OFFICE VISIT (OUTPATIENT)
Dept: FAMILY MEDICINE | Facility: CLINIC | Age: 25
End: 2025-04-29
Payer: COMMERCIAL

## 2025-04-29 VITALS
BODY MASS INDEX: 24.88 KG/M2 | RESPIRATION RATE: 16 BRPM | OXYGEN SATURATION: 100 % | WEIGHT: 168 LBS | HEART RATE: 70 BPM | TEMPERATURE: 98.8 F | DIASTOLIC BLOOD PRESSURE: 86 MMHG | SYSTOLIC BLOOD PRESSURE: 134 MMHG | HEIGHT: 69 IN

## 2025-04-29 DIAGNOSIS — J30.2 SEASONAL ALLERGIC RHINITIS, UNSPECIFIED TRIGGER: ICD-10-CM

## 2025-04-29 DIAGNOSIS — Z11.3 SCREEN FOR STD (SEXUALLY TRANSMITTED DISEASE): Primary | ICD-10-CM

## 2025-04-29 DIAGNOSIS — A54.9 GONORRHEA: ICD-10-CM

## 2025-04-29 DIAGNOSIS — R03.0 ELEVATED BP WITHOUT DIAGNOSIS OF HYPERTENSION: ICD-10-CM

## 2025-04-29 DIAGNOSIS — A74.9 CHLAMYDIA: ICD-10-CM

## 2025-04-29 LAB
HBV SURFACE AG SERPL QL IA: NONREACTIVE
HIV 1+2 AB+HIV1 P24 AG SERPL QL IA: NONREACTIVE

## 2025-04-29 PROCEDURE — 86780 TREPONEMA PALLIDUM: CPT | Performed by: FAMILY MEDICINE

## 2025-04-29 PROCEDURE — 87591 N.GONORRHOEAE DNA AMP PROB: CPT | Performed by: FAMILY MEDICINE

## 2025-04-29 PROCEDURE — 3075F SYST BP GE 130 - 139MM HG: CPT | Performed by: FAMILY MEDICINE

## 2025-04-29 PROCEDURE — 36415 COLL VENOUS BLD VENIPUNCTURE: CPT | Performed by: FAMILY MEDICINE

## 2025-04-29 PROCEDURE — 87389 HIV-1 AG W/HIV-1&-2 AB AG IA: CPT | Performed by: FAMILY MEDICINE

## 2025-04-29 PROCEDURE — 99214 OFFICE O/P EST MOD 30 MIN: CPT | Performed by: FAMILY MEDICINE

## 2025-04-29 PROCEDURE — 3079F DIAST BP 80-89 MM HG: CPT | Performed by: FAMILY MEDICINE

## 2025-04-29 PROCEDURE — 87491 CHLMYD TRACH DNA AMP PROBE: CPT | Performed by: FAMILY MEDICINE

## 2025-04-29 PROCEDURE — 87340 HEPATITIS B SURFACE AG IA: CPT | Performed by: FAMILY MEDICINE

## 2025-04-29 PROCEDURE — 1126F AMNT PAIN NOTED NONE PRSNT: CPT | Performed by: FAMILY MEDICINE

## 2025-04-29 RX ORDER — FLUTICASONE PROPIONATE 50 MCG
2 SPRAY, SUSPENSION (ML) NASAL DAILY
Qty: 9 ML | Refills: 4 | Status: SHIPPED | OUTPATIENT
Start: 2025-04-29

## 2025-04-29 ASSESSMENT — PAIN SCALES - GENERAL: PAINLEVEL_OUTOF10: NO PAIN (0)

## 2025-04-29 NOTE — PROGRESS NOTES
"  Assessment & Plan     Screen for STD (sexually transmitted disease)  Pending   Info safe sex   - NEISSERIA GONORRHOEA PCR; Future  - CHLAMYDIA TRACHOMATIS PCR; Future  - Hepatitis B surface antigen; Future  - HIV Antigen Antibody Combo Cascade; Future  - Treponema Abs w Reflex to RPR and Titer; Future  - NEISSERIA GONORRHOEA PCR  - CHLAMYDIA TRACHOMATIS PCR  - Hepatitis B surface antigen  - HIV Antigen Antibody Combo Cascade  - Treponema Abs w Reflex to RPR and Titer    Seasonal allergic rhinitis, unspecified trigger  Advised claritin otc daily   - fluticasone (FLONASE) 50 MCG/ACT nasal spray; Spray 2 sprays into both nostrils daily.    Elevated BP without diagnosis of hypertension  Advised stop High energy Drinks  He takes several a day        Follow up if not better  Subjective   Samson is a 24 year old, presenting for the following health issues:  STD (Patient would like screening. )        4/29/2025    11:36 AM   Additional Questions   Roomed by Camilla     History of Present Illness       Reason for visit:  Std testing   He is taking medications regularly.    In a new Relationship and wants STD check  Has No symptoms  History of chlamydia in the past   Also has had a cough for 2 weeks  No fever  Has a Runny Nose   Has allergies  seasonal               Review of Systems  Rest of the ROS is Negative except see above and Problem list [stable]        Objective    /86   Pulse 70   Temp 98.8  F (37.1  C) (Temporal)   Resp 16   Ht 1.748 m (5' 8.82\")   Wt 76.2 kg (168 lb)   SpO2 100%   BMI 24.94 kg/m    Body mass index is 24.94 kg/m .  Physical Exam   GENERAL: alert and no distress  HENT: ear canals and TM's normal, nose and mouth without ulcers or lesions  NECK: no adenopathy, no asymmetry, masses, or scars  RESP: lungs clear to auscultation - no rales, rhonchi or wheezes  CV: regular rate and rhythm, normal S1 S2, no S3 or S4, no murmur, click or rub, no peripheral edema  ABDOMEN: soft, nontender, no " hepatosplenomegaly, no masses and bowel sounds normal  MS: no gross musculoskeletal defects noted, no edema    Pending         Signed Electronically by: Daisha Bledsoe MD

## 2025-04-30 ENCOUNTER — TELEPHONE (OUTPATIENT)
Dept: FAMILY MEDICINE | Facility: CLINIC | Age: 25
End: 2025-04-30

## 2025-04-30 PROBLEM — A74.9 CHLAMYDIA: Status: ACTIVE | Noted: 2025-04-30

## 2025-04-30 PROBLEM — A54.9 GONORRHEA: Status: ACTIVE | Noted: 2025-04-30

## 2025-04-30 LAB
C TRACH DNA SPEC QL NAA+PROBE: POSITIVE
N GONORRHOEA DNA SPEC QL NAA+PROBE: POSITIVE
SPECIMEN TYPE: ABNORMAL
SPECIMEN TYPE: ABNORMAL
T PALLIDUM AB SER QL: NONREACTIVE

## 2025-04-30 RX ORDER — CEFTRIAXONE SODIUM 1 G
1 VIAL (EA) INJECTION ONCE
Status: COMPLETED | OUTPATIENT
Start: 2025-04-30 | End: 2025-05-01

## 2025-04-30 RX ORDER — DOXYCYCLINE HYCLATE 100 MG
100 TABLET ORAL 2 TIMES DAILY
Qty: 14 TABLET | Refills: 0 | Status: SHIPPED | OUTPATIENT
Start: 2025-04-30 | End: 2025-05-07

## 2025-04-30 NOTE — TELEPHONE ENCOUNTER
Patient calling regarding lab results from yesterday. No result note at this time.     Preferred pharmacy cued.     Thank you,  Georgia Jurado RN

## 2025-04-30 NOTE — PATIENT INSTRUCTIONS
Chlamydia: Care Instructions  Overview  Chlamydia is a bacterial infection spread through sexual contact. It's one of the most common sexually transmitted infections (STIs). Most people who get chlamydia don't have symptoms. But they can still infect their sex partners.  Antibiotics can cure chlamydia. Your sex partner or partners also need treatment so they don't spread the infection.  Tell your doctor if you might be pregnant. Some antibiotics should not be used during pregnancy.  Treatment is important. If chlamydia isn't treated, it can cause a severe infection of the uterus, fallopian tubes, or ovaries. (This is called pelvic inflammatory disease, or PID.) PID can make it hard to get pregnant.  Follow-up care is a key part of your treatment and safety. Be sure to make and go to all appointments, and call your doctor if you are having problems. It's also a good idea to know your test results and keep a list of the medicines you take.  How can you care for yourself at home?  If your doctor prescribed antibiotics to take at home, take them as directed. Don't stop taking them just because you feel better. You need to take the full course of antibiotics.  Don't have sex with anyone while you are being treated. If your treatment is a single dose of antibiotics, wait at least 7 days after you take the dose before you have sex. Even if you use a condom, you and your partner may pass the infection back and forth.  Make sure to tell your sex partner or partners that you have chlamydia. They should get treated, even if they don't have symptoms.  Get any tests your doctor suggests. Your doctor may do tests for other STIs. And you may be advised to get tested again for chlamydia in several months.  How can you prevent it?  Here are some ways to help prevent STIs.  Limit your sex partners. Sex with one partner who has sex only with you can reduce your risk of getting an STI.  Talk with your partner or partners about STIs  "before you have sex. Find out if they are at risk for an STI. Remember that it's possible to have an STI and not know it.  Wait to have sex with new partners until you've each been tested.  Don't have sex if you have symptoms of an infection or if you are being treated for an STI.  Use a condom every time you have sex. Condoms are the only form of birth control that also helps prevent STIs.  If you had sex without a condom, ask your doctor if taking a preventive medicine is recommended. It may help prevent certain STIs if it's taken within 24 to 72 hours after unprotected sex.  Don't share sex toys. But if you do share them, use a condom and clean the sex toys between each use.  Vaccines are available for some STIs, such as HPV. Ask your doctor for more information.  When should you call for help?   Call 911 anytime you think you may need emergency care. For example, call if:    You have sudden, severe pain in your belly or pelvis.   Call your doctor now or seek immediate medical care if:    You have new belly or pelvic pain.     You have a fever.     You have new or increased burning or pain with urination, or you cannot urinate.     You have pain, swelling, or tenderness in the scrotum.   Watch closely for changes in your health, and be sure to contact your doctor if:    You have unusual vaginal bleeding.     You have a discharge from the vagina or penis.     You think you may have been exposed to another STI.     Your symptoms get worse or have not improved within 1 week after starting treatment.     You have any new symptoms, such as sores, bumps, rashes, blisters, or warts in the genital or anal area.   Where can you learn more?  Go to https://www.[employer].net/patiented  Enter X780 in the search box to learn more about \"Chlamydia: Care Instructions.\"  Current as of: April 30, 2024  Content Version: 14.4    0872-4640 [employer] [employer].   Care instructions adapted under license by [facility] " professional. If you have questions about a medical condition or this instruction, always ask your healthcare professional. [employer] disclaims any warranty or liability for your use of this information.    Safer Sex: Care Instructions  Overview  Safer sex is a way to reduce your risk of getting a sexually transmitted infection (STI). It can also help prevent pregnancy.  Several products can help you practice safer sex and reduce your chance of STIs. One of the best is a condom. There are internal and external condoms. You can use a special rubber sheet (dental dam) for protection during oral sex. Disposable gloves can keep your hands from touching blood, semen, or other body fluids that can carry infections.  Remember that birth control methods such as diaphragms, IUDs, foams, and birth control pills do not stop you from getting STIs.  Follow-up care is a key part of your treatment and safety. Be sure to make and go to all appointments, and call your doctor if you are having problems. It's also a good idea to know your test results and keep a list of the medicines you take.  How can you care for yourself at home?  Think about getting vaccinated to help prevent hepatitis A, hepatitis B, and human papillomavirus (HPV). They can be spread through sex.  Use a condom every time you have sex. Use an external condom, which goes on the penis. Or use an internal condom, which goes into the vagina or anus.  Make sure you use the right size external condom. A condom that's too small can break easily. A condom that's too big can slip off during sex.  Use a new condom each time you have sex. Be careful not to poke a hole in the condom when you open the wrapper.  Don't use an internal condom and an external condom at the same time.  Never use petroleum jelly (such as Vaseline), grease, hand lotion, baby oil, or anything with oil in it. These products can make holes in the condom.  After intercourse, hold the edge of  "the condom as you remove it. This will help keep semen from spilling out of the condom.  Do not have sex with anyone who has symptoms of an STI, such as sores on the genitals or mouth.  Do not drink a lot of alcohol or use drugs before sex.  Limit your sex partners. Sex with one partner who has sex only with you can reduce your risk of getting an STI.  Don't share sex toys. But if you do share them, use a condom and clean the sex toys between each use.  Talk to any partners before you have sex. Talk about what you feel comfortable with and whether you have any boundaries with sex. And find out if your partner or partners may be at risk for any STI. Keep in mind that a person may be able to spread an STI even if they do not have symptoms. You and any partners may want to get tested for STIs.  Where can you learn more?  Go to https://www.[employer].com.[employer]patiented  Enter B608 in the search box to learn more about \"Safer Sex: Care Instructions.\"  Current as of: April 30, 2024  Content Version: 14.4    7109-2683 [employer].   Care instructions adapted under license by your healthcare professional. If you have questions about a medical condition or this instruction, always ask your healthcare professional. [employer] disclaims any warranty or liability for your use of this information.    Learning How to Use a Male Condom  What is a male condom?     A male condom is a tube of soft rubber or plastic with a closed end. It fits over the penis.  Condoms can be used to prevent pregnancy. They can also help protect against sexually transmitted infections (STIs). You must use a new condom every time you have sex.  Condoms prevent pregnancy by keeping sperm and eggs apart. The condom holds the sperm so the sperm can't get into the vagina.  There are many kinds of male condoms. Some condoms are lubricated. Some are ribbed. Most have a \"reservoir tip\" for holding the semen. You can also buy condoms of " different sizes.  How do you use a condom?  Condoms work best if you follow these steps.  Use a new condom each time you have sex.  Check the condom's expiration date. Do not use it past that date.  When opening the condom wrapper, be sure not to poke a hole in the condom with your fingernails, teeth, or other sharp objects.  Put the condom on as soon as the penis is hard (erect) and before any sexual contact with your partner.  First, hold the tip of the condom and squeeze out the air. This leaves room for the semen after you ejaculate.  If you are not circumcised, pull down the loose skin from the head of the penis (foreskin) before you put on the condom.  Hold on to the tip of the condom as you unroll the condom. Unroll it all the way down to the base of the penis.  After you ejaculate, hold on to the condom at the base of the penis, and withdraw from your partner while your penis is still erect. This will keep semen from spilling out of the condom.  Wash your hands after you handle a used condom.  How do you buy and store condoms?  Male condoms may be available for free at family planning clinics. You can buy them without a prescription at drugstores, online, and in some grocery stores.  Keep condoms wrapped in their original packages until you are ready to use them. Store them in a cool, dry place out of direct sunlight.  Don't keep rubber (latex) condoms in a glove compartment or other hot places for a long time. Heat weakens latex and increases the chance that the condom will break.  Don't use condoms in damaged packages. And don't use condoms that are brittle, sticky, or discolored, even if they are not past their expiration date.  What else do you need to know?  To protect yourself and your partner from STIs, use a condom during vaginal, oral, or anal sex. You can use a male condom at the same time as you are using another form of birth control.  You can use a condom with hormonal contraception, an  "intrauterine device (IUD), a diaphragm, a sponge, a shield, or a cervical cap.  Don't use a male condom with a female condom.  If you use spermicide with a condom, don't put spermicide inside the condom.  If the condom breaks or you think sperm may have leaked out into the vagina, you can use emergency contraception to help prevent pregnancy. The most effective emergency contraception is an IUD (inserted by a doctor). You can also get emergency contraceptive pills. You can get them with a prescription from your doctor or without a prescription at [facility].  If you or your partner gets a rash or feels itchy after using a latex condom, talk to your doctor. You may have a latex allergy.  Where can you learn more?  Go to https://www.[employer].HALKAR/patiented  Enter R522 in the search box to learn more about \"Learning How to Use a Male Condom.\"  Current as of: April 30, 2024  Content Version: 14.4    6984-9502 [employer].   Care instructions adapted under license by your healthcare professional. If you have questions about a medical condition or this instruction, always ask your healthcare professional. [employer] disclaims any warranty or liability for your use of this information.    Gonorrhea: Care Instructions  Overview  Gonorrhea is a bacterial infection spread through sexual contact (sexually transmitted infection, or STI). It is found most often in the genital area but it can also infect other areas of the body, such as the rectum or throat. While some people who have gonorrhea develop symptoms within a few days after infection, some people have no symptoms. Symptoms of gonorrhea include abnormal bleeding, pain or burning during urination, or a thick discharge from the vagina or penis.  Antibiotics can cure gonorrhea. Both sex partners need to be treated to keep from passing the infection back and forth.  Follow-up care is a key part of your treatment and safety. Be sure to make and " go to all appointments, and call your doctor if you are having problems. It's also a good idea to know your test results and keep a list of the medicines you take.  How can you prevent it?  Here are some ways to help prevent STIs.  Limit your sex partners. Sex with one partner who has sex only with you can reduce your risk of getting an STI.  Talk with your partner or partners about STIs before you have sex. Find out if they are at risk for an STI. Remember that it's possible to have an STI and not know it.  Wait to have sex with new partners until you've each been tested.  Don't have sex if you have symptoms of an infection or if you are being treated for an STI.  Use a condom every time you have sex. Condoms are the only form of birth control that also helps prevent STIs.  If you had sex without a condom, ask your doctor if taking a preventive medicine is recommended. It may help prevent certain STIs if it's taken within 24 to 72 hours after unprotected sex.  Don't share sex toys. But if you do share them, use a condom and clean the sex toys between each use.  Vaccines are available for some STIs, such as HPV. Ask your doctor for more information.  When should you call for help?   Call 911 anytime you think you may need emergency care. For example, call if:    You have sudden, severe pain in your belly or pelvis.   Call your doctor now or seek immediate medical care if:    You have new belly or pelvic pain.     You have unusual vaginal bleeding.     You have a fever.     You have a discharge from the vagina or penis.     You have new or increased burning or pain with urination, or you cannot urinate.     You have pain, swelling, or tenderness in the scrotum.     You have joint pain.     You have pus coming from your eyes.   Watch closely for changes in your health, and be sure to contact your doctor if:    You think you may have been exposed to another STI.     Your symptoms get worse or have not improved within 1  "week after starting treatment.     You have any new symptoms, such as sores, bumps, rashes, blisters, or warts in the genital or anal area.     You have a new skin rash.   Where can you learn more?  Go to https://www.[employer].net/patiented  Enter C967 in the search box to learn more about \"Gonorrhea: Care Instructions.\"  Current as of: April 30, 2024  Content Version: 14.4    5669-6330 [employer].   Care instructions adapted under license by your healthcare professional. If you have questions about a medical condition or this instruction, always ask your healthcare professional. [employer] disclaims any warranty or liability for your use of this information.    "

## 2025-04-30 NOTE — ADDENDUM NOTE
Addended by: WAYNE WINTER on: 4/30/2025 01:26 PM     Modules accepted: Orders     [facility] called stating she would like to speak to Dr. Kamara's nurse. [facility] states the information will be in system regarding below note.  Please advise and call and speak to anyone at [facility] [phone].

## 2025-05-01 ENCOUNTER — ALLIED HEALTH/NURSE VISIT (OUTPATIENT)
Dept: FAMILY MEDICINE | Facility: CLINIC | Age: 25
End: 2025-05-01

## 2025-05-01 DIAGNOSIS — A74.9 CHLAMYDIA: Primary | ICD-10-CM

## 2025-05-01 DIAGNOSIS — A54.9 GONORRHEA: ICD-10-CM

## 2025-05-01 RX ADMIN — Medication 1 G: at 08:57

## 2025-05-01 NOTE — PROGRESS NOTES
The following medication was given:     MEDICATION: Rocephin 1g and Lidocaine 2.1cc  ROUTE: IM  SITE: Ventrogluteal - Right  DOSE: 1g  LOT #: [id number]  EXPIRATION DATE:  9/2025    Patient advised to stay in clinic for 30mins and report any adverse reactions.    Angy VALENZUELA CMA 5/1/2025 9:00 AM